# Patient Record
Sex: FEMALE | Race: WHITE | Employment: FULL TIME | URBAN - METROPOLITAN AREA
[De-identification: names, ages, dates, MRNs, and addresses within clinical notes are randomized per-mention and may not be internally consistent; named-entity substitution may affect disease eponyms.]

---

## 2018-08-08 ENCOUNTER — OFFICE VISIT (OUTPATIENT)
Dept: FAMILY MEDICINE CLINIC | Age: 48
End: 2018-08-08
Payer: COMMERCIAL

## 2018-08-08 ENCOUNTER — HOSPITAL ENCOUNTER (OUTPATIENT)
Age: 48
Setting detail: SPECIMEN
Discharge: HOME | End: 2018-08-08
Payer: COMMERCIAL

## 2018-08-08 VITALS
WEIGHT: 156.6 LBS | SYSTOLIC BLOOD PRESSURE: 114 MMHG | DIASTOLIC BLOOD PRESSURE: 83 MMHG | HEIGHT: 62 IN | HEART RATE: 101 BPM | TEMPERATURE: 98 F | BODY MASS INDEX: 28.82 KG/M2

## 2018-08-08 DIAGNOSIS — F41.1 GAD (GENERALIZED ANXIETY DISORDER): Primary | ICD-10-CM

## 2018-08-08 DIAGNOSIS — Z00.00 HEALTHCARE MAINTENANCE: ICD-10-CM

## 2018-08-08 DIAGNOSIS — R23.2 HOT FLASHES: ICD-10-CM

## 2018-08-08 LAB
CHOLESTEROL/HDL RATIO: 2.6
CHOLESTEROL: 259 MG/DL
ESTIMATED AVERAGE GLUCOSE: 100 MG/DL
HBA1C MFR BLD: 5.1 % (ref 4–6)
HDLC SERPL-MCNC: 98 MG/DL
LDL CHOLESTEROL: 142 MG/DL (ref 0–130)
TRIGL SERPL-MCNC: 94 MG/DL
VLDLC SERPL CALC-MCNC: ABNORMAL MG/DL (ref 1–30)

## 2018-08-08 PROCEDURE — 99203 OFFICE O/P NEW LOW 30 MIN: CPT | Performed by: HOSPITALIST

## 2018-08-08 RX ORDER — BUSPIRONE HYDROCHLORIDE 7.5 MG/1
7.5 TABLET ORAL 2 TIMES DAILY
Qty: 60 TABLET | Refills: 0 | Status: SHIPPED | OUTPATIENT
Start: 2018-08-08 | End: 2018-10-19 | Stop reason: SDUPTHER

## 2018-08-08 ASSESSMENT — ENCOUNTER SYMPTOMS
WHEEZING: 0
VOMITING: 0
SORE THROAT: 0
SHORTNESS OF BREATH: 0
ABDOMINAL PAIN: 0
NAUSEA: 0
RHINORRHEA: 0
DIARRHEA: 0
CONSTIPATION: 0
BACK PAIN: 0
COUGH: 0

## 2018-08-08 ASSESSMENT — PATIENT HEALTH QUESTIONNAIRE - PHQ9
SUM OF ALL RESPONSES TO PHQ9 QUESTIONS 1 & 2: 0
2. FEELING DOWN, DEPRESSED OR HOPELESS: 0
SUM OF ALL RESPONSES TO PHQ QUESTIONS 1-9: 0
1. LITTLE INTEREST OR PLEASURE IN DOING THINGS: 0
SUM OF ALL RESPONSES TO PHQ QUESTIONS 1-9: 0

## 2018-08-08 NOTE — PROGRESS NOTES
Attending Physician Statement  I have discussed the care of Ezeinincluding pertinent history and exam findings,  with the resident. I have reviewed the key elements of all parts of the encounter with the resident. I agree with the assessment, plan and orders as documented by the resident.   (GE Modifier)    Vasomotor symptoms- Post hysterectomy wants to see GYN  Pauline- Buspar 7.5 mg po bid/  referral
Visit Information    Have you changed or started any medications since your last visit including any over-the-counter medicines, vitamins, or herbal medicines? no   Have you stopped taking any of your medications? Is so, why? -  no  Are you having any side effects from any of your medications? - no    Have you seen any other physician or provider since your last visit? yes - pcp   Have you had any other diagnostic tests since your last visit?  no   Have you been seen in the emergency room and/or had an admission in a hospital since we last saw you?  no   Have you had your routine dental cleaning in the past 6 months?  no     Do you have an active [employer] account? If no, what is the barrier?   No: inactive    No care team member to display    Medical History Review  Past Medical, Family, and Social History reviewed and does not contribute to the patient presenting condition    Health Maintenance   Topic Date Due    HIV screen  03/15/1984    DTaP/Tdap/Td vaccine (1 - Tdap) 03/15/1988    Cervical cancer screen  03/15/1990    Lipid screen  03/15/2009    Diabetes screen  03/15/2009    Flu vaccine (1) 09/01/2018
speech difficulty, numbness and headaches. Psychiatric/Behavioral: Positive for sleep disturbance. Negative for confusion, decreased concentration, self-injury and suicidal ideas. The patient is nervous/anxious. Objective:    /83 (Site: Left Arm, Position: Sitting, Cuff Size: Medium Adult)   Pulse 101   Temp 98 °F (36.7 °C) (Oral)   Ht 5' 2\" (1.575 m)   Wt 156 lb 9.6 oz (71 kg)   BMI 28.64 kg/m²    BP Readings from Last 3 Encounters:   08/08/18 114/83     Physical Exam   Constitutional: She is oriented to person, place, and time. She appears well-developed and well-nourished. HENT:   Head: Normocephalic and atraumatic. Right Ear: Hearing and tympanic membrane normal. No drainage, swelling or tenderness. No middle ear effusion. Left Ear: Hearing and tympanic membrane normal. No drainage, swelling or tenderness. No middle ear effusion. Mouth/Throat: Uvula is midline, oropharynx is clear and moist and mucous membranes are normal. No oropharyngeal exudate. Neck: Normal range of motion. Neck supple. Cardiovascular: Normal rate, regular rhythm and normal heart sounds. Pulmonary/Chest: Effort normal and breath sounds normal. She has no wheezes. Abdominal: Soft. Bowel sounds are normal. There is no tenderness. Neurological: She is alert and oriented to person, place, and time. No results found for: WBC, HGB, HCT, PLT, CHOL, TRIG, HDL, LDLDIRECT, ALT, AST, NA, K, CL, CREATININE, BUN, CO2, TSH, PSA, INR, GLUF, LABA1C, LABMICR  No results found for: CALCIUM, PHOS  No results found for: LDLCALC, LDLCHOLESTEROL, LDLDIRECT    Assessment:     1. MINNA (generalized anxiety disorder)    2. Hot flashes    3. Healthcare maintenance        Plan:    1. MINNA (generalized anxiety disorder)  - will initiate psychotherapy/pharmacotherapy  - follow up with Dr. Annika Pascal, re started on buspar  - busPIRone (BUSPAR) 7.5 MG tablet; Take 1 tablet by mouth 2 times daily  Dispense: 60 tablet;  Refill:

## 2018-08-08 NOTE — PATIENT INSTRUCTIONS
Thank you for letting us take care of you today. We hope all your questions were addressed. If a question was overlooked or something else comes to mind after you return home, please contact a member of your Care Team listed below. Please make sure you have a routine office visit set up to follow-up on [address]. Your Care Team at [place] [address] is Team #2  Aleisha Aguilar DO (Faculty)  Tad Lam MD (Resident)  Jc Ward MD (Resident)  Cesar Smalls MD (Resident)  Boston Degroot MD (Resident)  Pedro Sewell MD (Resident)  Darling Miller, LPN  Aurora Koroma., [facility], [address] ([address] [facility])  Viry Milan RN, ([address] [facility])  Jason Gutierrez, Ph.D., (Behavioral Services)  Mariann Whiting, [address] [place] [address] (Clinical Pharmacist)     Office phone number: [phone]    If you need to get in right away due to illness, please be advised we have \"Same Day\" appointments available Monday-Friday. Please call us at [phone] option #3 to schedule your \"Same Day\" appointment. Patient Education          buspirone  Pronunciation:  kathy muir  Brand: BuSpar  What is the most important information I should know about buspirone? Do not use buspirone if you have taken an MAO inhibitor in the past 14 days. A dangerous drug interaction could occur. MAO inhibitors include isocarboxazid, linezolid, methylene blue injection, phenelzine, rasagiline, selegiline, and tranylcypromine. What is buspirone? Buspirone is an anti-anxiety medicine that affects chemicals in the brain that may be unbalanced in people with anxiety. Buspirone is used to treat symptoms of anxiety, such as fear, tension, irritability, dizziness, pounding heartbeat, and other physical symptoms. Buspirone is not an anti-psychotic medication and should not be used in place of medication prescribed by your doctor for mental illness.   Buspirone may also be used for purposes not listed in this medication instruction, always ask your healthcare professional. [place] [address] any warranty or liability for your use of this information. Patient Education        Anxiety Disorder: Care Instructions  Your Care Instructions    Anxiety is a normal reaction to stress. Difficult situations can cause you to have symptoms such as sweaty palms and a nervous feeling. In an anxiety disorder, the symptoms are far more severe. Constant worry, muscle tension, trouble sleeping, nausea and diarrhea, and other symptoms can make normal daily activities difficult or impossible. These symptoms may occur for no reason, and they can affect your work, school, or social life. Medicines, counseling, and self-care can all help. Follow-up care is a key part of your treatment and safety. Be sure to make and go to all appointments, and call your doctor if you are having problems. It's also a good idea to know your test results and keep a list of the medicines you take. How can you care for yourself at home? Take medicines exactly as directed. Call your doctor if you think you are having a problem with your medicine. Go to your counseling sessions and follow-up appointments. Recognize and accept your anxiety. Then, when you are in a situation that makes you anxious, say to yourself, \"This is not an emergency. I feel uncomfortable, but I am not in danger. I can keep going even if I feel anxious. \"  Be kind to your body:  Relieve tension with exercise or a massage. Get enough rest.  Avoid alcohol, caffeine, nicotine, and illegal drugs. They can increase your anxiety level and cause sleep problems. Learn and do relaxation techniques. See below for more about these techniques. Engage your mind. Get out and do something you enjoy. Go to a funny movie, or take a walk or hike. Plan your day. Having too much or too little to do can make you anxious. Keep a record of your symptoms.  Discuss your fears with a good friend or family

## 2018-09-10 ENCOUNTER — OFFICE VISIT (OUTPATIENT)
Dept: FAMILY MEDICINE CLINIC | Age: 48
End: 2018-09-10
Payer: COMMERCIAL

## 2018-09-10 ENCOUNTER — HOSPITAL ENCOUNTER (OUTPATIENT)
Age: 48
Setting detail: SPECIMEN
Discharge: HOME | End: 2018-09-10
Payer: COMMERCIAL

## 2018-09-10 VITALS
SYSTOLIC BLOOD PRESSURE: 123 MMHG | DIASTOLIC BLOOD PRESSURE: 79 MMHG | HEIGHT: 62 IN | BODY MASS INDEX: 29.26 KG/M2 | WEIGHT: 159 LBS | TEMPERATURE: 97.5 F | HEART RATE: 79 BPM

## 2018-09-10 DIAGNOSIS — Z23 NEED FOR TDAP VACCINATION: ICD-10-CM

## 2018-09-10 DIAGNOSIS — Z11.4 ENCOUNTER FOR SCREENING FOR HIV: ICD-10-CM

## 2018-09-10 DIAGNOSIS — F41.9 ANXIETY: ICD-10-CM

## 2018-09-10 DIAGNOSIS — R79.89 HIGH SERUM LOW-DENSITY LIPOPROTEIN (LDL): ICD-10-CM

## 2018-09-10 DIAGNOSIS — F41.9 ANXIETY: Primary | ICD-10-CM

## 2018-09-10 LAB
HIV AG/AB: NONREACTIVE
TSH SERPL DL<=0.05 MIU/L-ACNC: 0.73 MIU/L (ref 0.3–5)

## 2018-09-10 PROCEDURE — G0008 ADMIN INFLUENZA VIRUS VAC: HCPCS | Performed by: FAMILY MEDICINE

## 2018-09-10 PROCEDURE — 99213 OFFICE O/P EST LOW 20 MIN: CPT | Performed by: STUDENT IN AN ORGANIZED HEALTH CARE EDUCATION/TRAINING PROGRAM

## 2018-09-10 PROCEDURE — 90715 TDAP VACCINE 7 YRS/> IM: CPT

## 2018-09-10 RX ORDER — BUSPIRONE HYDROCHLORIDE 5 MG/1
7.5 TABLET ORAL 2 TIMES DAILY
Qty: 90 TABLET | Refills: 0 | Status: SHIPPED | OUTPATIENT
Start: 2018-09-10 | End: 2018-10-10

## 2018-09-10 RX ORDER — BUSPIRONE HYDROCHLORIDE 5 MG/1
7.5 TABLET ORAL 2 TIMES DAILY
COMMUNITY
End: 2018-09-10 | Stop reason: SDUPTHER

## 2018-09-10 ASSESSMENT — ENCOUNTER SYMPTOMS
COUGH: 0
WHEEZING: 0
SHORTNESS OF BREATH: 0

## 2018-09-10 NOTE — PATIENT INSTRUCTIONS
Instructions    Anxiety is a normal reaction to stress. Difficult situations can cause you to have symptoms such as sweaty palms and a nervous feeling. In an anxiety disorder, the symptoms are far more severe. Constant worry, muscle tension, trouble sleeping, nausea and diarrhea, and other symptoms can make normal daily activities difficult or impossible. These symptoms may occur for no reason, and they can affect your work, school, or social life. Medicines, counseling, and self-care can all help. Follow-up care is a key part of your treatment and safety. Be sure to make and go to all appointments, and call your doctor if you are having problems. It's also a good idea to know your test results and keep a list of the medicines you take. How can you care for yourself at home? · Take medicines exactly as directed. Call your doctor if you think you are having a problem with your medicine. · Go to your counseling sessions and follow-up appointments. · Recognize and accept your anxiety. Then, when you are in a situation that makes you anxious, say to yourself, \"This is not an emergency. I feel uncomfortable, but I am not in danger. I can keep going even if I feel anxious. \"  · Be kind to your body:  ¨ Relieve tension with exercise or a massage. ¨ Get enough rest.  ¨ Avoid alcohol, caffeine, nicotine, and illegal drugs. They can increase your anxiety level and cause sleep problems. ¨ Learn and do relaxation techniques. See below for more about these techniques. · Engage your mind. Get out and do something you enjoy. Go to a funny movie, or take a walk or hike. Plan your day. Having too much or too little to do can make you anxious. · Keep a record of your symptoms. Discuss your fears with a good friend or family member, or join a support group for people with similar problems. Talking to others sometimes relieves stress. · Get involved in social groups, or volunteer to help others.  Being alone sometimes makes help?  Call 911 anytime you think you may need emergency care. For example, call if:    · You feel you cannot stop from hurting yourself or someone else.   Denise Rivera the numbers for these national suicide hotlines: 6-548-018-TALK (8-767.647.1582) and 7-023-WILGKSY (8-540.511.4559). If you or someone you know talks about suicide or feeling hopeless, get help right away.   Watch closely for changes in your health, and be sure to contact your doctor if:    · You have anxiety or fear that affects your life.     · You have symptoms of anxiety that are new or different from those you had before. Where can you learn more? Go to https://[employer]. org and sign in to your [employer] account. Enter P754 in the "[employer] box to learn more about \"Anxiety Disorder: Care Instructions. \"     If you do not have an account, please click on the \"Sign Up Now\" link. Current as of: December 7, 2017  Content Version: 11.7  © 3641-8941 [employer]. Care instructions adapted under license by [facility] ([facility]). If you have questions about a medical condition or this instruction, always ask your healthcare professional. [address] any warranty or liability for your use of this information. Patient Education        Cholesterol and Triglycerides Tests: About These Tests  What are they? Cholesterol and triglycerides tests measure the amount of fats in your blood. These fats have both \"good\" (HDL) and \"bad\" (LDL) cholesterol. Why are these tests done? These tests are done to help find out your risk of a heart attack and stroke. They can help your doctor find out how well medicine is working for some health problems. How can you prepare for these tests? · Your doctor may tell you to fast before your tests. This means that you do not eat or drink anything except water for 9 to 12 hours before the tests.  In most cases, you can take your medicines with water the morning of the test.  · Do not eat high-fat foods the night before the tests. · Do not drink alcohol or do intense exercise the night before the tests. · Be sure to tell your doctor about all the over-the-counter and prescription medicines and herbs or other supplements you take. They can affect the results of these tests. What happens during these tests? A health professional takes a sample of your blood. What else should you know about these tests? Your cholesterol levels can help your doctor find out your risk for having a heart attack or stroke. But it's not just about your cholesterol. Your doctor uses your cholesterol levels plus other things to calculate your risk. These include:  · Your blood pressure. · Whether or not you have diabetes. · Your age, sex, and race. · Whether or not you smoke. You and your doctor can talk about whether you need to lower your risk and what treatment is best for you. Where can you learn more? Go to https://[employer]ango.[employer]. org and sign in to your [employer] account. Enter V861 in the [employer] box to learn more about \"Cholesterol and Triglycerides Tests: About These Tests. \"     If you do not have an account, please click on the \"Sign Up Now\" link. Current as of: May 10, 2017  Content Version: 11.7  © 1400-6080 [employer]. Care instructions adapted under license by [facility] ([facility]). If you have questions about a medical condition or this instruction, always ask your healthcare professional. [address] any warranty or liability for your use of this information.

## 2018-09-10 NOTE — PROGRESS NOTES
Attending Physician Statement  I have discussed the care of Vannormaustinincluding pertinent history and exam findings,  with the resident. I have reviewed the key elements of all parts of the encounter with the resident. I agree with the assessment, plan and orders as documented by the resident.   (GE Modifier)    MINNA- Stable on Buspar/TSH  HM updated

## 2018-09-10 NOTE — PROGRESS NOTES
Visit Information    Have you changed or started any medications since your last visit including any over-the-counter medicines, vitamins, or herbal medicines? no   Have you stopped taking any of your medications? Is so, why? -  no  Are you having any side effects from any of your medications? - no    Have you seen any other physician or provider since your last visit?  no   Have you had any other diagnostic tests since your last visit?  no   Have you been seen in the emergency room and/or had an admission in a hospital since we last saw you?  no   Have you had your routine dental cleaning in the past 6 months?  no     Do you have an active MyChart account? If no, what is the barrier?   No:     Patient Care Team:  Linda Garcia MD as PCP - General (Family Medicine)    Medical History Review  Past Medical, Family, and Social History reviewed and does not contribute to the patient presenting condition    Health Maintenance   Topic Date Due    HIV screen  03/15/1984    DTaP/Tdap/Td vaccine (1 - Tdap) 03/15/1988    Cervical cancer screen  03/15/1990    Flu vaccine (1) 09/01/2018    Diabetes screen  08/08/2021    Lipid screen  08/08/2023

## 2018-10-19 ENCOUNTER — OFFICE VISIT (OUTPATIENT)
Dept: FAMILY MEDICINE CLINIC | Age: 48
End: 2018-10-19
Payer: COMMERCIAL

## 2018-10-19 VITALS
BODY MASS INDEX: 28.89 KG/M2 | HEART RATE: 84 BPM | SYSTOLIC BLOOD PRESSURE: 118 MMHG | HEIGHT: 62 IN | DIASTOLIC BLOOD PRESSURE: 83 MMHG | WEIGHT: 157 LBS

## 2018-10-19 DIAGNOSIS — F41.1 GENERALIZED ANXIETY DISORDER: ICD-10-CM

## 2018-10-19 DIAGNOSIS — F41.1 GAD (GENERALIZED ANXIETY DISORDER): ICD-10-CM

## 2018-10-19 DIAGNOSIS — S46.911D STRAIN OF RIGHT SHOULDER, SUBSEQUENT ENCOUNTER: Primary | ICD-10-CM

## 2018-10-19 PROBLEM — J01.01 ACUTE RECURRENT MAXILLARY SINUSITIS: Status: ACTIVE | Noted: 2018-01-23

## 2018-10-19 PROBLEM — N95.1 MENOPAUSE SYNDROME: Status: ACTIVE | Noted: 2017-08-18

## 2018-10-19 PROBLEM — N95.1 HOT FLASHES DUE TO MENOPAUSE: Status: ACTIVE | Noted: 2017-08-15

## 2018-10-19 PROBLEM — Z12.31 ENCOUNTER FOR SCREENING MAMMOGRAM FOR MALIGNANT NEOPLASM OF BREAST: Status: ACTIVE | Noted: 2018-07-11

## 2018-10-19 PROBLEM — E66.3 OVERWEIGHT (BMI 25.0-29.9): Status: ACTIVE | Noted: 2017-08-15

## 2018-10-19 PROCEDURE — 99213 OFFICE O/P EST LOW 20 MIN: CPT | Performed by: HOSPITALIST

## 2018-10-19 RX ORDER — BUSPIRONE HYDROCHLORIDE 15 MG/1
15 TABLET ORAL 2 TIMES DAILY
Qty: 60 TABLET | Refills: 3 | Status: SHIPPED | OUTPATIENT
Start: 2018-10-19 | End: 2018-11-18

## 2018-10-19 ASSESSMENT — ENCOUNTER SYMPTOMS
WHEEZING: 0
SHORTNESS OF BREATH: 0
BACK PAIN: 0
COUGH: 0

## 2018-10-19 NOTE — PROGRESS NOTES
HYPERTENSION visit     BP Readings from Last 3 Encounters:   10/19/18 118/83   09/10/18 123/79   08/08/18 114/83       LDL Cholesterol (mg/dL)   Date Value   08/08/2018 142 (H)     HDL (mg/dL)   Date Value   08/08/2018 98              Have you changed or started any medications since your last visit including any over-the-counter medicines, vitamins, or herbal medicines? no   Have you stopped taking any of your medications? Is so, why? -  no  Are you having any side effects from any of your medications? - no  How often do you miss doses of your medication? no      Have you seen any other physician or provider since your last visit? yes - urgent care   Have you had any other diagnostic tests since your last visit? yes -    Have you been seen in the emergency room and/or had an admission in a hospital since we last saw you?  yes -    Have you had your routine dental cleaning in the past 6 months?  no     Do you have an active MyChart account? If no, what is the barrier?   No:     Patient Care Team:  Poncho Massey MD as PCP - General (Family Medicine)    Medical History Review  Past Medical, Family, and Social History reviewed and does not contribute to the patient presenting condition    Health Maintenance   Topic Date Due    Cervical cancer screen  03/15/1990    Flu vaccine (1) 09/01/2018    Diabetes screen  08/08/2021    Lipid screen  08/08/2023    DTaP/Tdap/Td vaccine (2 - Td) 09/10/2028    HIV screen  Completed
Component Value Date    LDLCHOLESTEROL 142 (H) 08/08/2018    (goal LDL reduction with dx if diabetes is 50% LDL reduction)      PHQ Scores 8/8/2018   PHQ2 Score 0   PHQ9 Score 0     Interpretation of Total Score Depression Severity: 1-4 = Minimal depression, 5-9 = Mild depression, 10-14 = Moderate depression, 15-19 = Moderately severe depression, 20-27 = Severe depression  Discussed use, benefit, and side effects of prescribed medications. Barriers to medicationcompliance addressed. All patient questions answered. Pt voiced understanding. Medications Discontinued During This Encounter   Medication Reason    busPIRone (BUSPAR) 7.5 MG tablet REORDER       Return in about 2 months (around 12/19/2018) for anxiety.

## 2018-11-18 PROBLEM — Z12.31 ENCOUNTER FOR SCREENING MAMMOGRAM FOR MALIGNANT NEOPLASM OF BREAST: Status: RESOLVED | Noted: 2018-07-11 | Resolved: 2018-11-18

## 2019-03-07 ENCOUNTER — OFFICE VISIT (OUTPATIENT)
Dept: FAMILY MEDICINE CLINIC | Age: 49
End: 2019-03-07
Payer: COMMERCIAL

## 2019-03-07 VITALS
SYSTOLIC BLOOD PRESSURE: 108 MMHG | WEIGHT: 159.4 LBS | HEART RATE: 72 BPM | HEIGHT: 62 IN | TEMPERATURE: 97 F | BODY MASS INDEX: 29.33 KG/M2 | DIASTOLIC BLOOD PRESSURE: 68 MMHG

## 2019-03-07 DIAGNOSIS — J01.00 ACUTE NON-RECURRENT MAXILLARY SINUSITIS: Primary | ICD-10-CM

## 2019-03-07 DIAGNOSIS — F41.9 ANXIETY: ICD-10-CM

## 2019-03-07 PROCEDURE — G8427 DOCREV CUR MEDS BY ELIG CLIN: HCPCS | Performed by: HOSPITALIST

## 2019-03-07 PROCEDURE — G8484 FLU IMMUNIZE NO ADMIN: HCPCS | Performed by: HOSPITALIST

## 2019-03-07 PROCEDURE — 1036F TOBACCO NON-USER: CPT | Performed by: HOSPITALIST

## 2019-03-07 PROCEDURE — 99213 OFFICE O/P EST LOW 20 MIN: CPT | Performed by: HOSPITALIST

## 2019-03-07 PROCEDURE — G8419 CALC BMI OUT NRM PARAM NOF/U: HCPCS | Performed by: HOSPITALIST

## 2019-03-07 RX ORDER — BUSPIRONE HYDROCHLORIDE 30 MG/1
30 TABLET ORAL DAILY
Qty: 30 TABLET | Refills: 0 | Status: SHIPPED | OUTPATIENT
Start: 2019-03-07 | End: 2020-01-15

## 2019-03-07 RX ORDER — CETIRIZINE HYDROCHLORIDE 10 MG/1
10 TABLET ORAL DAILY
Qty: 30 TABLET | Refills: 0 | Status: SHIPPED | OUTPATIENT
Start: 2019-03-07 | End: 2019-04-06

## 2019-03-07 RX ORDER — BUSPIRONE HYDROCHLORIDE 30 MG/1
TABLET ORAL
Refills: 0 | COMMUNITY
Start: 2019-02-04 | End: 2019-03-07 | Stop reason: SDUPTHER

## 2019-03-07 RX ORDER — FLUTICASONE PROPIONATE 50 MCG
1 SPRAY, SUSPENSION (ML) NASAL DAILY
Qty: 2 BOTTLE | Refills: 1 | Status: SHIPPED | OUTPATIENT
Start: 2019-03-07

## 2019-03-07 ASSESSMENT — ENCOUNTER SYMPTOMS
SORE THROAT: 1
BACK PAIN: 0
SINUS PRESSURE: 1
HOARSE VOICE: 0
ABDOMINAL PAIN: 0
NAUSEA: 0
CONSTIPATION: 0
VOMITING: 0
DIARRHEA: 0
SINUS COMPLAINT: 1
SHORTNESS OF BREATH: 0
SWOLLEN GLANDS: 0
WHEEZING: 0
COUGH: 1

## 2019-03-07 ASSESSMENT — PATIENT HEALTH QUESTIONNAIRE - PHQ9
SUM OF ALL RESPONSES TO PHQ QUESTIONS 1-9: 0
SUM OF ALL RESPONSES TO PHQ9 QUESTIONS 1 & 2: 0
SUM OF ALL RESPONSES TO PHQ QUESTIONS 1-9: 0
1. LITTLE INTEREST OR PLEASURE IN DOING THINGS: 0
2. FEELING DOWN, DEPRESSED OR HOPELESS: 0

## 2019-09-26 ENCOUNTER — OFFICE VISIT (OUTPATIENT)
Dept: FAMILY MEDICINE CLINIC | Age: 49
End: 2019-09-26
Payer: COMMERCIAL

## 2019-09-26 VITALS
BODY MASS INDEX: 29.81 KG/M2 | SYSTOLIC BLOOD PRESSURE: 124 MMHG | WEIGHT: 162 LBS | HEART RATE: 58 BPM | DIASTOLIC BLOOD PRESSURE: 86 MMHG | HEIGHT: 62 IN | TEMPERATURE: 97.7 F

## 2019-09-26 DIAGNOSIS — Z12.31 ENCOUNTER FOR SCREENING MAMMOGRAM FOR BREAST CANCER: ICD-10-CM

## 2019-09-26 DIAGNOSIS — Z23 NEED FOR PROPHYLACTIC VACCINATION AND INOCULATION AGAINST VARICELLA: ICD-10-CM

## 2019-09-26 DIAGNOSIS — K59.00 CONSTIPATION, UNSPECIFIED CONSTIPATION TYPE: Primary | ICD-10-CM

## 2019-09-26 PROCEDURE — 3017F COLORECTAL CA SCREEN DOC REV: CPT | Performed by: STUDENT IN AN ORGANIZED HEALTH CARE EDUCATION/TRAINING PROGRAM

## 2019-09-26 PROCEDURE — 1036F TOBACCO NON-USER: CPT | Performed by: STUDENT IN AN ORGANIZED HEALTH CARE EDUCATION/TRAINING PROGRAM

## 2019-09-26 PROCEDURE — 99213 OFFICE O/P EST LOW 20 MIN: CPT | Performed by: STUDENT IN AN ORGANIZED HEALTH CARE EDUCATION/TRAINING PROGRAM

## 2019-09-26 PROCEDURE — G8427 DOCREV CUR MEDS BY ELIG CLIN: HCPCS | Performed by: STUDENT IN AN ORGANIZED HEALTH CARE EDUCATION/TRAINING PROGRAM

## 2019-09-26 PROCEDURE — G8419 CALC BMI OUT NRM PARAM NOF/U: HCPCS | Performed by: STUDENT IN AN ORGANIZED HEALTH CARE EDUCATION/TRAINING PROGRAM

## 2019-09-26 RX ORDER — DOCUSATE SODIUM 100 MG/1
100 CAPSULE, LIQUID FILLED ORAL 2 TIMES DAILY
Qty: 60 CAPSULE | Refills: 0 | Status: SHIPPED | OUTPATIENT
Start: 2019-09-26 | End: 2019-10-26

## 2019-09-26 ASSESSMENT — ENCOUNTER SYMPTOMS
ABDOMINAL PAIN: 1
DIARRHEA: 0
ABDOMINAL DISTENTION: 1
BLOOD IN STOOL: 0
CONSTIPATION: 1
ANAL BLEEDING: 0
VOMITING: 0
CHEST TIGHTNESS: 0
NAUSEA: 0
COUGH: 0
SHORTNESS OF BREATH: 0
WHEEZING: 0

## 2019-09-30 ENCOUNTER — HOSPITAL ENCOUNTER (OUTPATIENT)
Dept: MAMMOGRAPHY | Age: 49
Discharge: HOME | End: 2019-10-02
Payer: COMMERCIAL

## 2019-09-30 DIAGNOSIS — Z12.31 ENCOUNTER FOR SCREENING MAMMOGRAM FOR BREAST CANCER: ICD-10-CM

## 2019-09-30 PROCEDURE — 77063 BREAST TOMOSYNTHESIS BI: CPT

## 2019-10-16 ENCOUNTER — HOSPITAL ENCOUNTER (OUTPATIENT)
Age: 49
Setting detail: SPECIMEN
Discharge: HOME | End: 2019-10-16
Payer: COMMERCIAL

## 2019-10-16 ENCOUNTER — OFFICE VISIT (OUTPATIENT)
Dept: FAMILY MEDICINE CLINIC | Age: 49
End: 2019-10-16
Payer: COMMERCIAL

## 2019-10-16 ENCOUNTER — TELEPHONE (OUTPATIENT)
Dept: GASTROENTEROLOGY | Age: 49
End: 2019-10-16

## 2019-10-16 VITALS
SYSTOLIC BLOOD PRESSURE: 125 MMHG | WEIGHT: 152 LBS | HEART RATE: 86 BPM | BODY MASS INDEX: 27.97 KG/M2 | HEIGHT: 62 IN | DIASTOLIC BLOOD PRESSURE: 80 MMHG

## 2019-10-16 DIAGNOSIS — Z12.11 COLON CANCER SCREENING: ICD-10-CM

## 2019-10-16 DIAGNOSIS — K59.00 CONSTIPATION, UNSPECIFIED CONSTIPATION TYPE: Primary | ICD-10-CM

## 2019-10-16 DIAGNOSIS — G89.29 CHRONIC MIDLINE LOW BACK PAIN WITHOUT SCIATICA: ICD-10-CM

## 2019-10-16 DIAGNOSIS — M54.50 CHRONIC MIDLINE LOW BACK PAIN WITHOUT SCIATICA: ICD-10-CM

## 2019-10-16 LAB
ANION GAP SERPL CALCULATED.3IONS-SCNC: 13 MMOL/L (ref 9–17)
BUN BLDV-MCNC: 12 MG/DL (ref 6–20)
BUN/CREAT BLD: ABNORMAL (ref 9–20)
CALCIUM SERPL-MCNC: 9.3 MG/DL (ref 8.6–10.4)
CHLORIDE BLD-SCNC: 107 MMOL/L (ref 98–107)
CO2: 22 MMOL/L (ref 20–31)
CREAT SERPL-MCNC: 0.85 MG/DL (ref 0.5–0.9)
GFR AFRICAN AMERICAN: >60 ML/MIN
GFR NON-AFRICAN AMERICAN: >60 ML/MIN
GFR SERPL CREATININE-BSD FRML MDRD: ABNORMAL ML/MIN/{1.73_M2}
GFR SERPL CREATININE-BSD FRML MDRD: ABNORMAL ML/MIN/{1.73_M2}
GLUCOSE BLD-MCNC: 101 MG/DL (ref 70–99)
POTASSIUM SERPL-SCNC: 3.8 MMOL/L (ref 3.7–5.3)
SODIUM BLD-SCNC: 142 MMOL/L (ref 135–144)

## 2019-10-16 PROCEDURE — 3017F COLORECTAL CA SCREEN DOC REV: CPT | Performed by: STUDENT IN AN ORGANIZED HEALTH CARE EDUCATION/TRAINING PROGRAM

## 2019-10-16 PROCEDURE — 1036F TOBACCO NON-USER: CPT | Performed by: STUDENT IN AN ORGANIZED HEALTH CARE EDUCATION/TRAINING PROGRAM

## 2019-10-16 PROCEDURE — 99213 OFFICE O/P EST LOW 20 MIN: CPT | Performed by: STUDENT IN AN ORGANIZED HEALTH CARE EDUCATION/TRAINING PROGRAM

## 2019-10-16 PROCEDURE — G8484 FLU IMMUNIZE NO ADMIN: HCPCS | Performed by: STUDENT IN AN ORGANIZED HEALTH CARE EDUCATION/TRAINING PROGRAM

## 2019-10-16 PROCEDURE — 99211 OFF/OP EST MAY X REQ PHY/QHP: CPT | Performed by: FAMILY MEDICINE

## 2019-10-16 PROCEDURE — G8427 DOCREV CUR MEDS BY ELIG CLIN: HCPCS | Performed by: STUDENT IN AN ORGANIZED HEALTH CARE EDUCATION/TRAINING PROGRAM

## 2019-10-16 PROCEDURE — G8419 CALC BMI OUT NRM PARAM NOF/U: HCPCS | Performed by: STUDENT IN AN ORGANIZED HEALTH CARE EDUCATION/TRAINING PROGRAM

## 2019-10-16 RX ORDER — NAPROXEN 375 MG/1
375 TABLET ORAL 2 TIMES DAILY PRN
Qty: 60 TABLET | Refills: 0 | Status: SHIPPED | OUTPATIENT
Start: 2019-10-16 | End: 2020-01-15

## 2019-10-16 ASSESSMENT — ENCOUNTER SYMPTOMS
VOMITING: 0
ABDOMINAL DISTENTION: 1
BLOOD IN STOOL: 0
CHEST TIGHTNESS: 0
NAUSEA: 0
ANAL BLEEDING: 0
ABDOMINAL PAIN: 1
WHEEZING: 0
DIARRHEA: 0
CONSTIPATION: 1

## 2019-11-05 ENCOUNTER — TELEPHONE (OUTPATIENT)
Dept: GASTROENTEROLOGY | Age: 49
End: 2019-11-05

## 2019-11-06 ENCOUNTER — HOSPITAL ENCOUNTER (OUTPATIENT)
Age: 49
Setting detail: SPECIMEN
Discharge: HOME | End: 2019-11-06
Payer: COMMERCIAL

## 2019-11-06 ENCOUNTER — OFFICE VISIT (OUTPATIENT)
Dept: GASTROENTEROLOGY | Age: 49
End: 2019-11-06
Payer: COMMERCIAL

## 2019-11-06 VITALS
SYSTOLIC BLOOD PRESSURE: 119 MMHG | BODY MASS INDEX: 27.98 KG/M2 | WEIGHT: 153 LBS | DIASTOLIC BLOOD PRESSURE: 80 MMHG | HEART RATE: 56 BPM

## 2019-11-06 DIAGNOSIS — K59.00 CONSTIPATION, UNSPECIFIED CONSTIPATION TYPE: Primary | ICD-10-CM

## 2019-11-06 DIAGNOSIS — K59.00 CONSTIPATION, UNSPECIFIED CONSTIPATION TYPE: ICD-10-CM

## 2019-11-06 LAB
ABSOLUTE EOS #: 0.03 K/UL (ref 0–0.44)
ABSOLUTE IMMATURE GRANULOCYTE: <0.03 K/UL (ref 0–0.3)
ABSOLUTE LYMPH #: 3.09 K/UL (ref 1.1–3.7)
ABSOLUTE MONO #: 0.4 K/UL (ref 0.1–1.2)
ALBUMIN SERPL-MCNC: 4.4 G/DL (ref 3.5–5.2)
ALBUMIN/GLOBULIN RATIO: 1.7 (ref 1–2.5)
ALP BLD-CCNC: 64 U/L (ref 35–104)
ALT SERPL-CCNC: 21 U/L (ref 5–33)
AST SERPL-CCNC: 25 U/L
BASOPHILS # BLD: 1 % (ref 0–2)
BASOPHILS ABSOLUTE: 0.03 K/UL (ref 0–0.2)
BILIRUB SERPL-MCNC: 0.33 MG/DL (ref 0.3–1.2)
BILIRUBIN DIRECT: 0.11 MG/DL
BILIRUBIN, INDIRECT: 0.22 MG/DL (ref 0–1)
DIFFERENTIAL TYPE: ABNORMAL
EOSINOPHILS RELATIVE PERCENT: 1 % (ref 1–4)
GLOBULIN: NORMAL G/DL (ref 1.5–3.8)
HCT VFR BLD CALC: 41.7 % (ref 36.3–47.1)
HEMOGLOBIN: 13.5 G/DL (ref 11.9–15.1)
IMMATURE GRANULOCYTES: 0 %
LYMPHOCYTES # BLD: 62 % (ref 24–43)
MCH RBC QN AUTO: 28.5 PG (ref 25.2–33.5)
MCHC RBC AUTO-ENTMCNC: 32.4 G/DL (ref 28.4–34.8)
MCV RBC AUTO: 88.2 FL (ref 82.6–102.9)
MONOCYTES # BLD: 8 % (ref 3–12)
NRBC AUTOMATED: 0 PER 100 WBC
PDW BLD-RTO: 12.9 % (ref 11.8–14.4)
PLATELET # BLD: 227 K/UL (ref 138–453)
PLATELET ESTIMATE: ABNORMAL
PMV BLD AUTO: 11.8 FL (ref 8.1–13.5)
RBC # BLD: 4.73 M/UL (ref 3.95–5.11)
RBC # BLD: ABNORMAL 10*6/UL
SEG NEUTROPHILS: 29 % (ref 36–65)
SEGMENTED NEUTROPHILS ABSOLUTE COUNT: 1.43 K/UL (ref 1.5–8.1)
TOTAL PROTEIN: 7 G/DL (ref 6.4–8.3)
WBC # BLD: 5 K/UL (ref 3.5–11.3)
WBC # BLD: ABNORMAL 10*3/UL

## 2019-11-06 PROCEDURE — G8484 FLU IMMUNIZE NO ADMIN: HCPCS | Performed by: INTERNAL MEDICINE

## 2019-11-06 PROCEDURE — G8427 DOCREV CUR MEDS BY ELIG CLIN: HCPCS | Performed by: INTERNAL MEDICINE

## 2019-11-06 PROCEDURE — 1036F TOBACCO NON-USER: CPT | Performed by: INTERNAL MEDICINE

## 2019-11-06 PROCEDURE — G8419 CALC BMI OUT NRM PARAM NOF/U: HCPCS | Performed by: INTERNAL MEDICINE

## 2019-11-06 PROCEDURE — 99204 OFFICE O/P NEW MOD 45 MIN: CPT | Performed by: INTERNAL MEDICINE

## 2019-11-06 PROCEDURE — 3017F COLORECTAL CA SCREEN DOC REV: CPT | Performed by: INTERNAL MEDICINE

## 2019-11-06 RX ORDER — AMOXICILLIN 250 MG
1 CAPSULE ORAL DAILY PRN
Qty: 30 TABLET | Refills: 2 | Status: SHIPPED | OUTPATIENT
Start: 2019-11-06 | End: 2021-02-10

## 2019-11-06 RX ORDER — OMEPRAZOLE 20 MG/1
20 CAPSULE, DELAYED RELEASE ORAL DAILY
Qty: 30 CAPSULE | Refills: 3 | Status: SHIPPED | OUTPATIENT
Start: 2019-11-06 | End: 2020-01-15 | Stop reason: ALTCHOICE

## 2019-11-06 RX ORDER — POLYETHYLENE GLYCOL 3350 17 G/17G
17 POWDER, FOR SOLUTION ORAL DAILY
Qty: 30 BOTTLE | Refills: 11 | COMMUNITY
Start: 2019-11-06 | End: 2019-12-06

## 2019-11-06 ASSESSMENT — ENCOUNTER SYMPTOMS
VOMITING: 1
NAUSEA: 1
EYES NEGATIVE: 1
ABDOMINAL DISTENTION: 1
RESPIRATORY NEGATIVE: 1
CONSTIPATION: 1
ABDOMINAL PAIN: 1
BACK PAIN: 1
DIARRHEA: 1
ALLERGIC/IMMUNOLOGIC NEGATIVE: 1

## 2019-12-04 ENCOUNTER — TELEPHONE (OUTPATIENT)
Dept: GASTROENTEROLOGY | Age: 49
End: 2019-12-04

## 2020-01-14 ENCOUNTER — TELEPHONE (OUTPATIENT)
Dept: GASTROENTEROLOGY | Age: 50
End: 2020-01-14

## 2020-01-15 ENCOUNTER — OFFICE VISIT (OUTPATIENT)
Dept: GASTROENTEROLOGY | Age: 50
End: 2020-01-15
Payer: COMMERCIAL

## 2020-01-15 VITALS
SYSTOLIC BLOOD PRESSURE: 124 MMHG | DIASTOLIC BLOOD PRESSURE: 79 MMHG | HEART RATE: 64 BPM | BODY MASS INDEX: 26.7 KG/M2 | WEIGHT: 146 LBS

## 2020-01-15 PROCEDURE — 1036F TOBACCO NON-USER: CPT | Performed by: INTERNAL MEDICINE

## 2020-01-15 PROCEDURE — 3017F COLORECTAL CA SCREEN DOC REV: CPT | Performed by: INTERNAL MEDICINE

## 2020-01-15 PROCEDURE — G8419 CALC BMI OUT NRM PARAM NOF/U: HCPCS | Performed by: INTERNAL MEDICINE

## 2020-01-15 PROCEDURE — 99214 OFFICE O/P EST MOD 30 MIN: CPT | Performed by: INTERNAL MEDICINE

## 2020-01-15 PROCEDURE — G8484 FLU IMMUNIZE NO ADMIN: HCPCS | Performed by: INTERNAL MEDICINE

## 2020-01-15 PROCEDURE — G8427 DOCREV CUR MEDS BY ELIG CLIN: HCPCS | Performed by: INTERNAL MEDICINE

## 2020-01-15 RX ORDER — SODIUM, POTASSIUM,MAG SULFATES 17.5-3.13G
1 SOLUTION, RECONSTITUTED, ORAL ORAL ONCE
Qty: 1 BOTTLE | Refills: 0 | Status: SHIPPED | OUTPATIENT
Start: 2020-01-15 | End: 2020-01-15

## 2020-01-15 RX ORDER — LACTULOSE 10 G/15ML
10 SOLUTION ORAL 2 TIMES DAILY
Qty: 1 BOTTLE | Refills: 1 | Status: SHIPPED | OUTPATIENT
Start: 2020-01-15 | End: 2021-02-10

## 2020-01-15 RX ORDER — OMEPRAZOLE 40 MG/1
40 CAPSULE, DELAYED RELEASE ORAL DAILY
Qty: 30 CAPSULE | Refills: 3 | Status: SHIPPED | OUTPATIENT
Start: 2020-01-15 | End: 2021-02-10

## 2020-01-15 ASSESSMENT — ENCOUNTER SYMPTOMS
ABDOMINAL DISTENTION: 1
CONSTIPATION: 1
VOMITING: 1
ANAL BLEEDING: 1
NAUSEA: 1
RECTAL PAIN: 1

## 2020-01-15 NOTE — PROGRESS NOTES
pains.  ROS was otherwise negative    Review of Systems    PHYSICAL EXAMINATION: Vital signs reviewed per the nursing documentation. /79   Pulse 64   Wt 146 lb (66.2 kg)   BMI 26.70 kg/m²   Body mass index is 26.7 kg/m². Physical Exam    Physical Exam   Constitutional: Patient is oriented to person, place, and time. Patient appears well-developed and well-nourished. HENT:   Head: Normocephalic and atraumatic. Eyes: Pupils are equal, round, and reactive to light. EOM are normal.   Neck: Normal range of motion. Neck supple. No JVD present. No tracheal deviation present. No thyromegaly present. Cardiovascular: Normal rate, regular rhythm, normal heart sounds and intact distal pulses. Pulmonary/Chest: Effort normal and breath sounds normal. No stridor. No respiratory distress. He has no wheezes. He has no rales. He exhibits no tenderness. Abdominal: Soft. Bowel sounds are normal. He exhibits no distension and no mass. There is no tenderness. There is no rebound and no guarding. No hernia. Musculoskeletal: Normal range of motion. Lymphadenopathy:    Patient has no cervical adenopathy. Neurological: Patient is alert and oriented to person, place, and time. Psychiatric: Patient has a normal mood and affect.  Patient behavior is normal.       LABORATORY DATA: Reviewed  Lab Results   Component Value Date    WBC 5.0 11/06/2019    HGB 13.5 11/06/2019    HCT 41.7 11/06/2019    MCV 88.2 11/06/2019     11/06/2019     10/16/2019    K 3.8 10/16/2019     10/16/2019    CO2 22 10/16/2019    BUN 12 10/16/2019    CREATININE 0.85 10/16/2019    LABALBU 4.4 11/06/2019    BILITOT 0.33 11/06/2019    ALKPHOS 64 11/06/2019    AST 25 11/06/2019    ALT 21 11/06/2019         Lab Results   Component Value Date    RBC 4.73 11/06/2019    HGB 13.5 11/06/2019    MCV 88.2 11/06/2019    MCH 28.5 11/06/2019    MCHC 32.4 11/06/2019    RDW 12.9 11/06/2019    MPV 11.8 11/06/2019    BASOPCT 1 11/06/2019 LYMPHSABS 3.09 11/06/2019    MONOSABS 0.40 11/06/2019    NEUTROABS 1.43 (L) 11/06/2019    EOSABS 0.03 11/06/2019    BASOSABS 0.03 11/06/2019         DIAGNOSTIC TESTING:     No results found. IMPRESSION:  Evaristo Armas is a 47 y.o. female with history of MINNA, chronic constipation, dependent on PO laxatives, referred for evaluation and treatment of her constipation.      Chronic constipation--approximately 7 yrs, 1 bowel movement per week, Exlax currently being used, dependent x 2 yrs, on fiber supplementation, previously on stool softeners    Prilosec changed to 40mg daily, currently taking 20mg prn.  2-3 bottles of water is routinely ingested, appears to be adequate  Will add lactulose   Pericolace BID  Plan for EGD and Colonoscopy, dx testing. RBA explained. Thank you for allowing me to participate in the care of Ms. Lane. For any further questions please do not hesitate to contact me. I have reviewed and agree with the ROS entered by the MA/LPN.          Francie Kramer MD, MPH   [facility]  Office #: [phone]

## 2020-02-04 ENCOUNTER — TELEPHONE (OUTPATIENT)
Dept: GASTROENTEROLOGY | Age: 50
End: 2020-02-04

## 2020-02-04 NOTE — TELEPHONE ENCOUNTER
Pino Seymour returned call to confirm colon/egd on 02/11/20. She has a  and confirmed timing of bowel prep.

## 2020-02-11 ENCOUNTER — ANESTHESIA (OUTPATIENT)
Dept: OPERATING ROOM | Age: 50
End: 2020-02-11
Payer: COMMERCIAL

## 2020-02-11 ENCOUNTER — ANESTHESIA EVENT (OUTPATIENT)
Dept: OPERATING ROOM | Age: 50
End: 2020-02-11
Payer: COMMERCIAL

## 2020-02-11 ENCOUNTER — HOSPITAL ENCOUNTER (OUTPATIENT)
Age: 50
Setting detail: OUTPATIENT SURGERY
Discharge: HOME | End: 2020-02-11
Attending: INTERNAL MEDICINE | Admitting: INTERNAL MEDICINE
Payer: COMMERCIAL

## 2020-02-11 VITALS
BODY MASS INDEX: 26.28 KG/M2 | RESPIRATION RATE: 10 BRPM | HEIGHT: 62 IN | DIASTOLIC BLOOD PRESSURE: 68 MMHG | SYSTOLIC BLOOD PRESSURE: 112 MMHG | WEIGHT: 142.8 LBS | HEART RATE: 64 BPM | OXYGEN SATURATION: 100 % | TEMPERATURE: 97.2 F

## 2020-02-11 VITALS — DIASTOLIC BLOOD PRESSURE: 63 MMHG | SYSTOLIC BLOOD PRESSURE: 94 MMHG | OXYGEN SATURATION: 98 %

## 2020-02-11 PROCEDURE — 43239 EGD BIOPSY SINGLE/MULTIPLE: CPT | Performed by: INTERNAL MEDICINE

## 2020-02-11 PROCEDURE — 3700000001 HC ADD 15 MINUTES (ANESTHESIA): Performed by: INTERNAL MEDICINE

## 2020-02-11 PROCEDURE — 45378 DIAGNOSTIC COLONOSCOPY: CPT | Performed by: INTERNAL MEDICINE

## 2020-02-11 PROCEDURE — 3609027000 HC COLONOSCOPY: Performed by: INTERNAL MEDICINE

## 2020-02-11 PROCEDURE — 3700000000 HC ANESTHESIA ATTENDED CARE: Performed by: INTERNAL MEDICINE

## 2020-02-11 PROCEDURE — 7100000011 HC PHASE II RECOVERY - ADDTL 15 MIN: Performed by: INTERNAL MEDICINE

## 2020-02-11 PROCEDURE — 88305 TISSUE EXAM BY PATHOLOGIST: CPT

## 2020-02-11 PROCEDURE — 2500000003 HC RX 250 WO HCPCS: Performed by: NURSE ANESTHETIST, CERTIFIED REGISTERED

## 2020-02-11 PROCEDURE — 6360000002 HC RX W HCPCS: Performed by: NURSE ANESTHETIST, CERTIFIED REGISTERED

## 2020-02-11 PROCEDURE — 2709999900 HC NON-CHARGEABLE SUPPLY: Performed by: INTERNAL MEDICINE

## 2020-02-11 PROCEDURE — 2580000003 HC RX 258: Performed by: ANESTHESIOLOGY

## 2020-02-11 PROCEDURE — 88342 IMHCHEM/IMCYTCHM 1ST ANTB: CPT

## 2020-02-11 PROCEDURE — 7100000010 HC PHASE II RECOVERY - FIRST 15 MIN: Performed by: INTERNAL MEDICINE

## 2020-02-11 PROCEDURE — 3609012400 HC EGD TRANSORAL BIOPSY SINGLE/MULTIPLE: Performed by: INTERNAL MEDICINE

## 2020-02-11 PROCEDURE — 2580000003 HC RX 258: Performed by: NURSE ANESTHETIST, CERTIFIED REGISTERED

## 2020-02-11 RX ORDER — SODIUM CHLORIDE 9 MG/ML
INJECTION, SOLUTION INTRAVENOUS CONTINUOUS
Status: DISCONTINUED | OUTPATIENT
Start: 2020-02-11 | End: 2020-02-11

## 2020-02-11 RX ORDER — SODIUM CHLORIDE 0.9 % (FLUSH) 0.9 %
10 SYRINGE (ML) INJECTION PRN
Status: DISCONTINUED | OUTPATIENT
Start: 2020-02-11 | End: 2020-02-11 | Stop reason: HOSPADM

## 2020-02-11 RX ORDER — SODIUM CHLORIDE, SODIUM LACTATE, POTASSIUM CHLORIDE, CALCIUM CHLORIDE 600; 310; 30; 20 MG/100ML; MG/100ML; MG/100ML; MG/100ML
INJECTION, SOLUTION INTRAVENOUS CONTINUOUS PRN
Status: DISCONTINUED | OUTPATIENT
Start: 2020-02-11 | End: 2020-02-11 | Stop reason: SDUPTHER

## 2020-02-11 RX ORDER — SODIUM CHLORIDE, SODIUM LACTATE, POTASSIUM CHLORIDE, CALCIUM CHLORIDE 600; 310; 30; 20 MG/100ML; MG/100ML; MG/100ML; MG/100ML
INJECTION, SOLUTION INTRAVENOUS CONTINUOUS
Status: DISCONTINUED | OUTPATIENT
Start: 2020-02-11 | End: 2020-02-11 | Stop reason: HOSPADM

## 2020-02-11 RX ORDER — SODIUM CHLORIDE 0.9 % (FLUSH) 0.9 %
10 SYRINGE (ML) INJECTION EVERY 12 HOURS SCHEDULED
Status: DISCONTINUED | OUTPATIENT
Start: 2020-02-11 | End: 2020-02-11 | Stop reason: HOSPADM

## 2020-02-11 RX ORDER — LIDOCAINE HYDROCHLORIDE 10 MG/ML
1 INJECTION, SOLUTION EPIDURAL; INFILTRATION; INTRACAUDAL; PERINEURAL
Status: DISCONTINUED | OUTPATIENT
Start: 2020-02-11 | End: 2020-02-11 | Stop reason: HOSPADM

## 2020-02-11 RX ORDER — PROPOFOL 10 MG/ML
INJECTION, EMULSION INTRAVENOUS PRN
Status: DISCONTINUED | OUTPATIENT
Start: 2020-02-11 | End: 2020-02-11 | Stop reason: SDUPTHER

## 2020-02-11 RX ORDER — LIDOCAINE HYDROCHLORIDE 20 MG/ML
INJECTION, SOLUTION INFILTRATION; PERINEURAL PRN
Status: DISCONTINUED | OUTPATIENT
Start: 2020-02-11 | End: 2020-02-11 | Stop reason: SDUPTHER

## 2020-02-11 RX ORDER — FENTANYL CITRATE 50 UG/ML
INJECTION, SOLUTION INTRAMUSCULAR; INTRAVENOUS PRN
Status: DISCONTINUED | OUTPATIENT
Start: 2020-02-11 | End: 2020-02-11 | Stop reason: SDUPTHER

## 2020-02-11 RX ADMIN — SODIUM CHLORIDE, POTASSIUM CHLORIDE, SODIUM LACTATE AND CALCIUM CHLORIDE: 600; 310; 30; 20 INJECTION, SOLUTION INTRAVENOUS at 09:36

## 2020-02-11 RX ADMIN — SODIUM CHLORIDE, POTASSIUM CHLORIDE, SODIUM LACTATE AND CALCIUM CHLORIDE: 600; 310; 30; 20 INJECTION, SOLUTION INTRAVENOUS at 09:00

## 2020-02-11 RX ADMIN — PROPOFOL 70 MG: 10 INJECTION, EMULSION INTRAVENOUS at 09:39

## 2020-02-11 RX ADMIN — LIDOCAINE HYDROCHLORIDE 60 MG: 20 INJECTION, SOLUTION INFILTRATION; PERINEURAL at 09:39

## 2020-02-11 RX ADMIN — Medication 100 MCG: at 09:39

## 2020-02-11 ASSESSMENT — PULMONARY FUNCTION TESTS
PIF_VALUE: 1

## 2020-02-11 ASSESSMENT — PAIN SCALES - GENERAL
PAINLEVEL_OUTOF10: 0

## 2020-02-11 ASSESSMENT — PAIN - FUNCTIONAL ASSESSMENT: PAIN_FUNCTIONAL_ASSESSMENT: 0-10

## 2020-02-11 NOTE — ANESTHESIA PRE PROCEDURE
Department of Anesthesiology  Preprocedure Note       Name:  Basil Garcia   Age:  47 y.o.  :  1970                                          MRN:  [id number]         Date:  2020      Surgeon: Keshia Granger):  Shani Leyva MD    Procedure: COLORECTAL CANCER SCREENING, NOT HIGH RISK (N/A )  EGD ESOPHAGOGASTRODUODENOSCOPY (N/A )    Medications prior to admission:   Prior to Admission medications    Medication Sig Start Date End Date Taking? Authorizing Provider   lactulose (CHRONULAC) 10 GM/15ML solution Take 15 mLs by mouth 2 times daily 1/15/20  Yes Shani Leyva MD   omeprazole (PRILOSEC) 40 MG delayed release capsule Take 1 capsule by mouth daily 1/15/20  Yes Shani Leyva MD   senna-docusate (PERICOLACE) 8.6-50 MG per tablet Take 1 tablet by mouth daily as needed for Constipation 19   Shani Leyva MD   fluticasone Sherleen Sox) 50 MCG/ACT nasal spray 1 spray by Each Nare route daily 1 Spray in each nostril 3/7/19   Isaac West MD       Current medications:    Current Facility-Administered Medications   Medication Dose Route Frequency Provider Last Rate Last Dose    lactated ringers infusion   Intravenous Continuous Shelli Trivedi MD        sodium chloride flush 0.9 % injection 10 mL  10 mL Intravenous 2 times per day Shelli Trivedi MD        sodium chloride flush 0.9 % injection 10 mL  10 mL Intravenous PRN Shelli Trivedi MD        lidocaine PF 1 % injection 1 mL  1 mL Intradermal Once PRN Shelli Trivedi MD           Allergies: Allergies   Allergen Reactions    Norco [Hydrocodone-Acetaminophen] Other (See Comments)     Upset stomach       Problem List:    Patient Active Problem List   Diagnosis Code    Generalized anxiety disorder F41.1    Menopause syndrome N95.1    Overweight (BMI 25.0-29. 9) E66.3    Hot flashes due to menopause N95.1    Acute recurrent maxillary sinusitis J01.01       Past Medical History:        Diagnosis Date    Acid reflux        Past Surgical History: Procedure Laterality Date    CHOLECYSTECTOMY      HYSTERECTOMY      TUBAL LIGATION      WRIST FRACTURE SURGERY Right     at 9years old       Social History:    Social History     Tobacco Use    Smoking status: Never Smoker    Smokeless tobacco: Never Used   Substance Use Topics    Alcohol use: Not Currently                                Counseling given: Not Answered      Vital Signs (Current):   Vitals:    02/11/20 0836 02/11/20 0850   BP: 96/77    Pulse: 70    Resp: 16    Temp: 97.6 °F (36.4 °C)    TempSrc: Oral    SpO2: 100%    Weight:  142 lb 12.8 oz (64.8 kg)   Height:  5' 2\" (1.575 m)                                              BP Readings from Last 3 Encounters:   02/11/20 96/77   01/15/20 124/79   11/06/19 119/80       NPO Status: Time of last liquid consumption: 2300                        Time of last solid consumption: 1800                        Date of last liquid consumption: 02/10/20                        Date of last solid food consumption: 02/09/20    BMI:   Wt Readings from Last 3 Encounters:   02/11/20 142 lb 12.8 oz (64.8 kg)   01/15/20 146 lb (66.2 kg)   11/06/19 153 lb (69.4 kg)     Body mass index is 26.12 kg/m². CBC:   Lab Results   Component Value Date    WBC 5.0 11/06/2019    RBC 4.73 11/06/2019    HGB 13.5 11/06/2019    HCT 41.7 11/06/2019    MCV 88.2 11/06/2019    RDW 12.9 11/06/2019     11/06/2019       CMP:   Lab Results   Component Value Date     10/16/2019    K 3.8 10/16/2019     10/16/2019    CO2 22 10/16/2019    BUN 12 10/16/2019    CREATININE 0.85 10/16/2019    GFRAA >60 10/16/2019    LABGLOM >60 10/16/2019    GLUCOSE 101 10/16/2019    PROT 7.0 11/06/2019    CALCIUM 9.3 10/16/2019    BILITOT 0.33 11/06/2019    ALKPHOS 64 11/06/2019    AST 25 11/06/2019    ALT 21 11/06/2019       POC Tests: No results for input(s): POCGLU, POCNA, POCK, POCCL, POCBUN, POCHEMO, POCHCT in the last 72 hours.     Coags: No results found for: PROTIME, INR, APTT    HCG (If Applicable): No results found for: PREGTESTUR, PREGSERUM, HCG, HCGQUANT     ABGs: No results found for: PHART, PO2ART, SWA5FWM, FHU3SVJ, BEART, N3FYIGVX     Type & Screen (If Applicable):  No results found for: LABABO, [address]    Anesthesia Evaluation  Patient summary reviewed and Nursing notes reviewed no history of anesthetic complications:   Airway: Mallampati: II  TM distance: >3 FB   Neck ROM: full  Mouth opening: > = 3 FB Dental: normal exam         Pulmonary:normal exam        (-) COPD and asthma                           Cardiovascular:  Exercise tolerance: no interval change,       (-) hypertension, past MI, CAD and CABG/stent        Rate: normal                    Neuro/Psych:   (+) psychiatric history:            GI/Hepatic/Renal:   (+) GERD:,      (-) hepatitis       Endo/Other:                     Abdominal:           Vascular:                                        Anesthesia Plan      MAC and general     ASA 2       Induction: intravenous. Anesthetic plan and risks discussed with patient. Plan discussed with CRNA.     Attending anesthesiologist reviewed and agrees with Pre Eval content              Luis Armando Yates DO   2/11/2020

## 2020-02-11 NOTE — OP NOTE
[facility]    STA ENDOSCOPY     EGD    PROCEDURE DATE: 02/11/20    REFERRING PHYSICIAN: No ref. provider found     PRIMARY CARE PROVIDER: Zaire Angel MD    ATTENDING PHYSICIAN: Dora Sharma MD     HISTORY: Ms. Sybil Hoover is a 47 y.o. female who presents to the [facility] Endoscopy unit for upper endoscopy. The patient's clinical history is remarkable for chronic constipation, MINNA, dyspepsia, referred for dx EGD and screening colonsocopy. She is currently medically stable and appropriate for the planned procedure. PREOPERATIVE DIAGNOSIS: dyspepsia. PROCEDURES:   1) Transoral Upper Endoscopy with cold biopsy of the stomach. POSTOPERATIVE DIAGNOSIS:    Mild non-specific antritis     MEDICATIONS:   MAC per anesthesia     EBL:  <10cc    INSTRUMENT: Olympus GIF-H180  flexible Gastroscope. PREPARATION: The nature and character of the procedure as well as risks, benefits, and alternatives were discussed with the patient and informed consent was obtained. Complications were said to include, but were not limited to: medication allergy, medication reaction, cardiovascular and respiratory problems, bleeding, perforation, infection, and/or missed diagnosis. Following arrival in the endoscopy room, the patient was placed in the left lateral decubitus position and final time-out accomplished in the presence of the nursing staff. Baseline vital signs were obtained and reviewed, and IV sedation was subsequently initiated. FINDINGS:   Esophagus: The esophagus was inspected to the Z-line. The endoscopic exam showed normal appearing esophagus and GEJ. Stomach: The stomach was inspected in both forward and retroflex fashion and was appropriately distensible. The cardia, fundus, incisura, antrum and pylorus were identified via direct visualization. The endoscopic exam showed mild antritis s/p cold biopsy to evaluate for H. Pylori.      Duodenum: The proximal small bowel was inspected through the bulb, sweep, and second portion of the duodenum. The endoscopic exam showed normal findings. IMPRESSION:    Mild non-specific antritis     RECOMMENDATIONS:   1) Follow up with referring provider, as previously scheduled. 2) proceed with colonoscopy       [facility]           [facility]     [facility] ENDOSCOPY     COLONOSCOPY    PROCEDURE DATE: 02/11/20    REFERRING PHYSICIAN: No ref. provider found     PRIMARY CARE PROVIDER: Cathleen Borrero MD    ATTENDING PHYSICIAN: Tanja Lugo MD     HISTORY: Ms. Quintin Gonzalez is a 47 y.o. female who presents to the [facility] endoscopy unit for colonoscopy. The patient's clinical history is remarkable for history as detailed above. She is currently medically stable and appropriate for the planned procedure. PREOPERATIVE DIAGNOSIS: constipation. PROCEDURES:   Transanal Colonoscopy --screening. POSTPROCEDURE DIAGNOSIS:    FAIR PREP     Moderate external and internal hemorrhoids  No large polyps, lesions, or concerning mucosal findings    MEDICATIONS:     MAC per anesthesia     EBL <10cc    INSTRUMENT: Olympus CF-H180 AL Pediatric flexible Colonoscope. PREPARATION: The nature and character of the procedure as well as risks, benefits, and alternatives were discussed with the patient and informed consent was obtained. Complications were said to include, but were not limited to: medication allergy, medication reaction, cardiovascular and respiratory problems, bleeding, perforation, infection, and/or missed diagnosis. Following arrival in the endoscopy room, the patient was placed in the left lateral decubitus position and final time-out accomplished in the presence of the nursing staff. Baseline vital signs were obtained and reviewed, and IV sedation was subsequently initiated.        FINDINGS: Rectal examination demonstrated no significant visible external abnormality and digital palpation was unremarkable. Following adequate conscious sedation the colonoscope was introduced and advanced under direct visualization to the cecum, which was identified by the ileocecal valve and appendiceal orifice. The bowel preparation was felt to be FAIR. This included moderate amounts of green, thick stool that was mostly able to be adequately irrigated and aspirated. Cecal intubation time was 9 minutes. Once maximally inserted, the endoscope was withdrawn and the mucosa was carefully inspected. The mucosal exam was revealed no large lesions or polyps. Retroflexion was performed in the rectum and moderate internal hemorrhoids. Withdrawal time was 23 minutes. IMPRESSION:      FAIR PREP     Moderate external and internal hemorrhoids  No large polyps, lesions, or concerning mucosal findings    RECOMMENDATIONS:   1) Follow up with referring provider, as previously scheduled.    2) Repeat Colonoscopy in 5 yrs, follow up in GI clinic        [facility]

## 2020-02-11 NOTE — ANESTHESIA POSTPROCEDURE EVALUATION
Department of Anesthesiology  Postprocedure Note    Patient: Stephanie Mckeon  MRN: [id number]  YOB: 1970  Date of evaluation: 2/11/2020  Time:  1:59 PM     Procedure Summary     Date:  02/11/20 Room / Location:  [place] [address]    Anesthesia Start:  6408 Anesthesia Stop:  1008    Procedures:       COLORECTAL CANCER SCREENING, NOT HIGH RISK (N/A )      EGD BIOPSY Diagnosis:  (DX DYSPEPSIA, CONSTIPATION)    Surgeon:  Emilia Tran MD Responsible Provider:  Kadi Vera DO    Anesthesia Type:  MAC, general ASA Status:  2          Anesthesia Type: MAC, general    Brisa Phase I: Brisa Score: 10    Brisa Phase II: Brisa Score: 8    Last vitals: Reviewed and per EMR flowsheets.        Anesthesia Post Evaluation    Patient location during evaluation: PACU  Patient participation: complete - patient participated  Level of consciousness: awake and alert  Airway patency: patent  Nausea & Vomiting: no nausea and no vomiting  Complications: no  Cardiovascular status: hemodynamically stable  Respiratory status: acceptable  Hydration status: stable

## 2020-02-11 NOTE — H&P
History and Physical GI Update    Pt Name: Elvin Bustos  MRN: [id number]  YOB: 1970  Date of evaluation: 2/11/2020      [x] I have reviewed the Progress note found in [facility] dated 1/15/20 by Dr. Alyson Sandoval which meets the criteria for an Interval History and Physical note and is attached below. [x] I have examined  Elvin Bustos and there are no changes to the patient or plans for a Colonoscopy and EGD. by Dr Dahiana Williamson for constipation , colon cancer screening, GERD and nausea and vomiting . Bowel Prep completed: Yes with clear yellow results. Last colonoscopy none. Denies history of ulcers, hiatal hernia,  or IBS. Previous EGD many years ago. Patient today denies fever, chills, night sweats, pain or unexplained weight loss. YES acid reflux/GERD,    Vital signs: BP 96/77   Pulse 70   Temp 97.6 °F (36.4 °C) (Oral)   Resp 16   Ht 5' 2\" (1.575 m)   Wt 142 lb 12.8 oz (64.8 kg)   SpO2 100%   BMI 26.12 kg/m²     Allergies:  Norco [hydrocodone-acetaminophen]    Medications:   No current facility-administered medications on file prior to encounter. Current Outpatient Medications on File Prior to Encounter   Medication Sig Dispense Refill    lactulose (CHRONULAC) 10 GM/15ML solution Take 15 mLs by mouth 2 times daily 1 Bottle 1    omeprazole (PRILOSEC) 40 MG delayed release capsule Take 1 capsule by mouth daily 30 capsule 3    senna-docusate (PERICOLACE) 8.6-50 MG per tablet Take 1 tablet by mouth daily as needed for Constipation 30 tablet 2    fluticasone (FLONASE) 50 MCG/ACT nasal spray 1 spray by Each Nare route daily 1 Spray in each nostril 2 Bottle 1            Prior to Admission medications    Medication Sig Start Date End Date Taking?  Authorizing Provider   lactulose (CHRONULAC) 10 GM/15ML solution Take 15 mLs by mouth 2 times daily 1/15/20  Yes Dahiana Williamson MD   omeprazole (PRILOSEC) 40 MG delayed release capsule Take 1 capsule by mouth daily 1/15/20  Yes Dahiana Williamson MD senna-docusate (PERICOLACE) 8.6-50 MG per tablet Take 1 tablet by mouth daily as needed for Constipation 11/6/19   Olive Hawley MD   fluticasone [facility]) 50 MCG/ACT nasal spray 1 spray by Each Nare route daily 1 Spray in each nostril 3/7/19   Dinah Rios MD       This is a 47 y.o. female who is  pleasant, cooperative, alert and oriented x3, in no acute distress. Heart: Heart regular rate and rhythm   Lungs:clear to auscultation without wheezes or rales    Abdomen: soft, nontender, nondistended, no masses or organomegaly    Labs:  No results for input(s): HGB, HCT, WBC, MCV, PLATELET, NA, K, CL, CO2, BUN, CREATININE, GLUCOSE, INR, PROTIME, APTT, AST, ALT, LABALBU, HCG in the last 720 hours. CHATA Leon,   Electronically signed 2/11/2020 at 9:03 AM                                                                                                   GI CLINIC FOLLOW UP     INTERVAL HISTORY:   No referring provider defined for this encounter. Chief Complaint   Patient presents with    Follow-up       6 wk lab follow up. Patient states still being constipated even though she takes extra of her medication. She tries not to take fiber. She states currently not having a BM since yesterday but she feels very backed up. She states straining a lot and having hemorrhoids               HISTORY OF PRESENT ILLNESS: Anamaria Ordoñez is a 47 y.o. female with a pasthistory remarkable for history of chronic constipation, history MINNA, referred for evaluation of and treatment of her constipation. She appears to be dependent on Senna glycoside/Ex-lax to induce bowel movements  She exercises on the regular  Adequate hydration  Non-smoker  Non-drinker. Past Medical,Family, and Social History reviewed and does contribute to the patient presenting condition. Patient's PMH/PSH,SH,PSYCH Hx, MEDs, ALLERGIES, and ROS were all reviewed and updated in the appropriate sections.      PAST MEDICAL organizations: Not on file       Relationship status: Not on file    Intimate partner violence:       Fear of current or ex partner: Not on file       Emotionally abused: Not on file       Physically abused: Not on file       Forced sexual activity: Not on file   Other Topics Concern    Not on file   Social History Narrative    Not on file            REVIEW OF SYSTEMS: A 12-point review of systems was obtained and pertinent positives and negatives were listed below. REVIEW OF SYSTEMS:     Constitutional: No fever, no chills, no lethargy, no weakness. HEENT:           No headache, otalgia, itchy eyes, nasal discharge or sore throat. Cardiac:           No chest pain, dyspnea, orthopnea or PND. Chest:              No cough, phlegm or wheezing. Abdomen:      Detailed by MA   Neuro:             No focal weakness, abnormal movements or seizure like activity. Skin:                No rashes, no itching. :                  No hematuria, no pyuria, no dysuria, no flank pain. Extremities:     No swelling or joint pains. ROS was otherwise negative     Review of Systems     PHYSICAL EXAMINATION: Vital signs reviewed per the nursing documentation. /79   Pulse 64   Wt 146 lb (66.2 kg)   BMI 26.70 kg/m²   Body mass index is 26.7 kg/m². Physical Exam     Physical Exam   Constitutional: Patient is oriented to person, place, and time. Patient appears well-developed and well-nourished. HENT:   Head: Normocephalic and atraumatic. Eyes: Pupils are equal, round, and reactive to light. EOM are normal.   Neck: Normal range of motion. Neck supple. No JVD present. No tracheal deviation present. No thyromegaly present. Cardiovascular: Normal rate, regular rhythm, normal heart sounds and intact distal pulses. Pulmonary/Chest: Effort normal and breath sounds normal. No stridor. No respiratory distress. He has no wheezes. He has no rales. He exhibits no tenderness. Abdominal: Soft.  Bowel sounds are normal. He exhibits no distension and no mass. There is no tenderness. There is no rebound and no guarding. No hernia. Musculoskeletal: Normal range of motion. Lymphadenopathy:    Patient has no cervical adenopathy. Neurological: Patient is alert and oriented to person, place, and time. Psychiatric: Patient has a normal mood and affect. Patient behavior is normal.         LABORATORY DATA: Reviewed        Lab Results   Component Value Date     WBC 5.0 11/06/2019     HGB 13.5 11/06/2019     HCT 41.7 11/06/2019     MCV 88.2 11/06/2019      11/06/2019      10/16/2019     K 3.8 10/16/2019      10/16/2019     CO2 22 10/16/2019     BUN 12 10/16/2019     CREATININE 0.85 10/16/2019     LABALBU 4.4 11/06/2019     BILITOT 0.33 11/06/2019     ALKPHOS 64 11/06/2019     AST 25 11/06/2019     ALT 21 11/06/2019                  Lab Results   Component Value Date     RBC 4.73 11/06/2019     HGB 13.5 11/06/2019     MCV 88.2 11/06/2019     MCH 28.5 11/06/2019     MCHC 32.4 11/06/2019     RDW 12.9 11/06/2019     MPV 11.8 11/06/2019     BASOPCT 1 11/06/2019     LYMPHSABS 3.09 11/06/2019     MONOSABS 0.40 11/06/2019     NEUTROABS 1.43 (L) 11/06/2019     EOSABS 0.03 11/06/2019     BASOSABS 0.03 11/06/2019            DIAGNOSTIC TESTING:      No results found. IMPRESSION:  Mishel Ramos is a 47 y.o. female with history of MINNA, chronic constipation, dependent on PO laxatives, referred for evaluation and treatment of her constipation. Chronic constipation--approximately 7 yrs, 1 bowel movement per week, Exlax currently being used, dependent x 2 yrs, on fiber supplementation, previously on stool softeners     Prilosec changed to 40mg daily, currently taking 20mg prn.  2-3 bottles of water is routinely ingested, appears to be adequate  Will add lactulose   Pericolace BID  Plan for EGD and Colonoscopy, dx testing. RBA explained. Thank you for allowing me to participate in the care of Ms. Lane.  For any further questions please do not hesitate to contact me. I have reviewed and agree with the ROS entered by the MA/VAUGHNN.             Emilia Tran MD, MPH   [facility]  Office #: [phone]      Revision History

## 2020-02-13 LAB — SURGICAL PATHOLOGY REPORT: NORMAL

## 2020-09-25 ENCOUNTER — OFFICE VISIT (OUTPATIENT)
Dept: FAMILY MEDICINE CLINIC | Age: 50
End: 2020-09-25
Payer: COMMERCIAL

## 2020-09-25 VITALS
TEMPERATURE: 98.8 F | BODY MASS INDEX: 24.29 KG/M2 | HEART RATE: 84 BPM | WEIGHT: 132 LBS | HEIGHT: 62 IN | DIASTOLIC BLOOD PRESSURE: 89 MMHG | SYSTOLIC BLOOD PRESSURE: 125 MMHG

## 2020-09-25 PROBLEM — M25.562 ACUTE PAIN OF LEFT KNEE: Status: ACTIVE | Noted: 2020-09-25

## 2020-09-25 PROCEDURE — 3017F COLORECTAL CA SCREEN DOC REV: CPT | Performed by: STUDENT IN AN ORGANIZED HEALTH CARE EDUCATION/TRAINING PROGRAM

## 2020-09-25 PROCEDURE — G8420 CALC BMI NORM PARAMETERS: HCPCS | Performed by: STUDENT IN AN ORGANIZED HEALTH CARE EDUCATION/TRAINING PROGRAM

## 2020-09-25 PROCEDURE — G8427 DOCREV CUR MEDS BY ELIG CLIN: HCPCS | Performed by: STUDENT IN AN ORGANIZED HEALTH CARE EDUCATION/TRAINING PROGRAM

## 2020-09-25 PROCEDURE — 1036F TOBACCO NON-USER: CPT | Performed by: STUDENT IN AN ORGANIZED HEALTH CARE EDUCATION/TRAINING PROGRAM

## 2020-09-25 PROCEDURE — 99213 OFFICE O/P EST LOW 20 MIN: CPT | Performed by: STUDENT IN AN ORGANIZED HEALTH CARE EDUCATION/TRAINING PROGRAM

## 2020-09-25 RX ORDER — NAPROXEN 500 MG/1
500 TABLET ORAL 2 TIMES DAILY WITH MEALS
COMMUNITY
End: 2021-01-21 | Stop reason: SDUPTHER

## 2020-09-25 ASSESSMENT — ENCOUNTER SYMPTOMS
ABDOMINAL PAIN: 0
NAUSEA: 0
COUGH: 0
DIARRHEA: 0
SHORTNESS OF BREATH: 0
CHEST TIGHTNESS: 0
BACK PAIN: 0
VOMITING: 0
WHEEZING: 0

## 2020-09-25 ASSESSMENT — PATIENT HEALTH QUESTIONNAIRE - PHQ9
2. FEELING DOWN, DEPRESSED OR HOPELESS: 0
SUM OF ALL RESPONSES TO PHQ QUESTIONS 1-9: 0
SUM OF ALL RESPONSES TO PHQ9 QUESTIONS 1 & 2: 0
1. LITTLE INTEREST OR PLEASURE IN DOING THINGS: 0
SUM OF ALL RESPONSES TO PHQ QUESTIONS 1-9: 0

## 2020-09-25 NOTE — PATIENT INSTRUCTIONS
Thank you for letting us take care of you today. We hope all your questions were addressed. If a question was overlooked or something else comes to mind after you return home, please contact a member of your Care Team listed below. Your Care Team at [place] [address] is Team #4  Ivna Conway MD (Faculty)  Christina Carpio MD (Resident)  Chaim Wyatt MD (Resident)  Lilian Hutson MD (Resident)  Konstantin Cuba MD (Resident)  FELECIA Gan,JOCY Posada[address] [facility] office)  Juju Wynn, [address] (Clinical Practice Manager)  Skylar MorrisseyKaiser [facility] (Clinical Pharmacist)       Office phone number: [phone]    If you need to get in right away due to illness, please be advised we have \"Same Day\" appointments available Monday-Friday. Please call us at [phone] option #3 to schedule your \"Same Day\" appointment.

## 2020-09-25 NOTE — PROGRESS NOTES
Visit Information    Have you changed or started any medications since your last visit including any over-the-counter medicines, vitamins, or herbal medicines? no   Have you stopped taking any of your medications? Is so, why? -  no  Are you having any side effects from any of your medications? - no    Have you seen any other physician or provider since your last visit?  no   Have you had any other diagnostic tests since your last visit?  no   Have you been seen in the emergency room and/or had an admission in a hospital since we last saw you?  no   Have you had your routine dental cleaning in the past 6 months?  no     Do you have an active MyChart account? If no, what is the barrier?   Yes    Patient Care Team:  Yesika Zarate MD as PCP - General (Family Medicine)  Emmanuelle Benavidez MD as Consulting Physician (Gastroenterology)    Medical History Review  Past Medical, Family, and Social History reviewed and does not contribute to the patient presenting condition    Health Maintenance   Topic Date Due    Flu vaccine (1) 09/01/2020    Shingles Vaccine (1 of 2) 10/16/2020 (Originally 3/15/2019)    Cervical cancer screen  03/13/2024 (Originally 3/15/1990)    Breast cancer screen  09/30/2021    Lipid screen  08/08/2023    DTaP/Tdap/Td vaccine (2 - Td) 09/10/2028    Colon cancer screen colonoscopy  02/11/2030    HIV screen  Completed    Hepatitis A vaccine  Aged Out    Hepatitis B vaccine  Aged Out    Hib vaccine  Aged Out    Meningococcal (ACWY) vaccine  Aged Out    Pneumococcal 0-64 years Vaccine  Aged Out

## 2020-09-25 NOTE — LETTER
[address]  [address]  [place] [address]  Phone: [phone]  Fax: [phone]    Chad Lunsford MD        September 25, 2020     Patient: Srini Bland   YOB: 1970   Date of Visit: 9/25/2020       To Whom it May Concern:    Isaak Nicholson was seen in my clinic on 9/25/2020. She may return to work on 9/30/2020. If you have any questions or concerns, please don't hesitate to call.     Sincerely,         Chad Lunsford MD

## 2020-09-25 NOTE — PROGRESS NOTES
Subjective:      Patient ID: Winston Bojorquez is a 45 y.o. female. Pt presents for acute L knee pain  On Sunday patient did new work-out, weight resistance, high impact  Denies moment of trauma, but next morning had L knee swelling, pain worsening to 7-8/10 aching without radiation  Pt went on Tuesday to urgent care, had knee XR denies any fractures, do not have images available  Pt was given knee compression sleeve, has been wearing currently  Gait is limited, pt cannot work her  job at this time, Requests work letter  Amenable to be seen by specialist  Denies any systemic signs, no fever/chills, able to bear weight but does take weight off the R knee, walking more on toes, denies any falls or any further trauma    Review of Systems   Constitutional: Negative for appetite change, chills, fever and unexpected weight change. Respiratory: Negative for cough, chest tightness, shortness of breath and wheezing. Cardiovascular: Negative for chest pain, palpitations and leg swelling. Gastrointestinal: Negative for abdominal pain, diarrhea, nausea and vomiting. Genitourinary: Negative for difficulty urinating, dysuria, flank pain and frequency. Musculoskeletal: Positive for arthralgias, gait problem and joint swelling. Negative for back pain, myalgias and neck pain. Skin: Negative for rash. Neurological: Negative for dizziness, syncope, weakness and headaches. Objective:   Physical Exam  Vitals signs reviewed. Constitutional:       General: She is not in acute distress. Appearance: She is well-developed. She is not diaphoretic. HENT:      Head: Normocephalic and atraumatic. Cardiovascular:      Rate and Rhythm: Normal rate and regular rhythm. Heart sounds: Normal heart sounds. No murmur. No gallop. Pulmonary:      Effort: Pulmonary effort is normal. No respiratory distress. Breath sounds: Normal breath sounds. No wheezing or rales.    Abdominal:      General: Bowel sounds are normal.      Palpations: Abdomen is soft. Tenderness: There is no abdominal tenderness. There is no guarding. Musculoskeletal: Normal range of motion. General: No tenderness. Right lower leg: No edema. Left lower leg: No edema. Comments: Tenderness (mild) to palpation of L knee  Flexion/extension limited by pain  No resistance of passive flexion/extension  No joint laxity, pt refuses anterior/posterior drawer test  No obvious effusion on palpation, No erythema/heat on joint palpation, no tracking up leg   Skin:     General: Skin is warm and dry. Capillary Refill: Capillary refill takes less than 2 seconds. Neurological:      Mental Status: She is alert and oriented to person, place, and time. Cranial Nerves: No cranial nerve deficit. Psychiatric:      Comments: Pt appears anxious       Vitals:    09/25/20 1554   BP: 125/89   Site: Left Upper Arm   Position: Sitting   Cuff Size: Medium Adult   Pulse: 84   Temp: 98.8 °F (37.1 °C)   TempSrc: Temporal   Weight: 132 lb (59.9 kg)   Height: 5' 2\" (1.575 m)       Assessment:       Diagnosis Orders   1.  Acute pain of left knee           Plan:      - C/w NSAID - naproxen, and icing of knee, compression, elevation, modified activity  - No indication of infectious/septic joint at this time, no red flag sxs at this time, no effusion for arthrocentesis at this time  - Pt to sign release of records for XR knee from urgent care  - f/u with Dr Jackie Farr sports medicine next  - Letter of work exemption given until then, next Wednesday  - f/u 2-4 weeks w/ PCP        Thurmond Merlin, MD

## 2020-09-30 ENCOUNTER — OFFICE VISIT (OUTPATIENT)
Dept: FAMILY MEDICINE CLINIC | Age: 50
End: 2020-09-30
Payer: COMMERCIAL

## 2020-09-30 VITALS
BODY MASS INDEX: 28.72 KG/M2 | WEIGHT: 157 LBS | HEART RATE: 94 BPM | DIASTOLIC BLOOD PRESSURE: 83 MMHG | SYSTOLIC BLOOD PRESSURE: 131 MMHG | TEMPERATURE: 98.1 F

## 2020-09-30 PROCEDURE — 1036F TOBACCO NON-USER: CPT | Performed by: FAMILY MEDICINE

## 2020-09-30 PROCEDURE — 99213 OFFICE O/P EST LOW 20 MIN: CPT | Performed by: FAMILY MEDICINE

## 2020-09-30 PROCEDURE — G8419 CALC BMI OUT NRM PARAM NOF/U: HCPCS | Performed by: FAMILY MEDICINE

## 2020-09-30 PROCEDURE — 3017F COLORECTAL CA SCREEN DOC REV: CPT | Performed by: FAMILY MEDICINE

## 2020-09-30 PROCEDURE — G8427 DOCREV CUR MEDS BY ELIG CLIN: HCPCS | Performed by: FAMILY MEDICINE

## 2020-09-30 PROCEDURE — 99211 OFF/OP EST MAY X REQ PHY/QHP: CPT | Performed by: FAMILY MEDICINE

## 2020-09-30 ASSESSMENT — ENCOUNTER SYMPTOMS
NAUSEA: 0
CONSTIPATION: 0
SHORTNESS OF BREATH: 0
SORE THROAT: 0
ABDOMINAL PAIN: 0
VOMITING: 0
CHEST TIGHTNESS: 0
DIARRHEA: 0
COUGH: 0

## 2020-09-30 NOTE — LETTER
[address]  [address]  [facility] [address]  Phone: [phone]  Fax: [address] Lambert, DO        September 30, 2020     Patient: Stephani Andrews   YOB: 1970   Date of Visit: 9/30/2020       To Whom It May Concern: It is my medical opinion that Osvaldo Nolen may return to work on Monday October 5th 2020 with no restrictions. If you have any questions or concerns, please don't hesitate to call.     Sincerely,        Fausto Rao, DO

## 2020-09-30 NOTE — PROGRESS NOTES
Visit Information    Have you changed or started any medications since your last visit including any over-the-counter medicines, vitamins, or herbal medicines? no   Have you stopped taking any of your medications? Is so, why? -  no  Are you having any side effects from any of your medications? - no    Have you seen any other physician or provider since your last visit?  no   Have you had any other diagnostic tests since your last visit?  no   Have you been seen in the emergency room and/or had an admission in a hospital since we last saw you?  no   Have you had your routine dental cleaning in the past 6 months?  no     Do you have an active MyChart account? If no, what is the barrier?   No: declined    Patient Care Team:  Dre Gold MD as PCP - General (Family Medicine)  Kalpesh Harris MD as Consulting Physician (Gastroenterology)    Medical History Review  Past Medical, Family, and Social History reviewed and does not contribute to the patient presenting condition    Health Maintenance   Topic Date Due    Flu vaccine (1) 09/01/2020    Shingles Vaccine (1 of 2) 10/16/2020 (Originally 3/15/2019)    Cervical cancer screen  03/13/2024 (Originally 3/15/1990)    Breast cancer screen  09/30/2021    Lipid screen  08/08/2023    DTaP/Tdap/Td vaccine (2 - Td) 09/10/2028    Colon cancer screen colonoscopy  02/11/2030    HIV screen  Completed    Hepatitis A vaccine  Aged Out    Hepatitis B vaccine  Aged Out    Hib vaccine  Aged Out    Meningococcal (ACWY) vaccine  Aged Out    Pneumococcal 0-64 years Vaccine  Aged Out

## 2020-09-30 NOTE — PROGRESS NOTES
Subjective:    Valentine Vera is a 45 y.o. female with  has a past medical history of Acid reflux. Family History   Problem Relation Age of Onset    Colon Cancer Maternal Grandmother        Presented tothe office today for:  Chief Complaint   Patient presents with    Knee Pain     patient states she has left knee pain, has been going on since 9/20. Has some swelling sometimes, using ibuprofen for treatment,        HPI    Chief Complaint: left knee pain  When did it happen? Sept 6th  Location: medial  Intensity: 5/10  Quality: sharp  Radiation: no bowel or bladder incontinence  Getting better, worse or staying the same? better  Aggravating: walking  Associated: cant bend and intense pain  Treatments/Medications: Ibuprofen and sleeve, ice and heat      Review of Systems   Constitutional: Negative for chills, fatigue, fever and unexpected weight change. HENT: Negative for congestion, mouth sores and sore throat. Eyes: Negative for visual disturbance. Respiratory: Negative for cough, chest tightness and shortness of breath. Cardiovascular: Negative for chest pain and leg swelling. Gastrointestinal: Negative for abdominal pain, constipation, diarrhea, nausea and vomiting. Genitourinary: Negative for difficulty urinating. Musculoskeletal: Negative for joint swelling. Left knee pain   Skin: Negative for rash. Neurological: Negative for dizziness, weakness and headaches.        Objective:    /83 (Site: Left Upper Arm, Position: Sitting, Cuff Size: Large Adult)   Pulse 94   Temp 98.1 °F (36.7 °C)   Wt 157 lb (71.2 kg)   BMI 28.72 kg/m²    BP Readings from Last 3 Encounters:   09/30/20 131/83   09/25/20 125/89   02/11/20 112/68     Physical Exam  Musculoskeletal:      Comments:  /83 (Site: Left Upper Arm, Position: Sitting, Cuff Size: Large Adult)   Pulse 94   Temp 98.1 °F (36.7 °C)   Wt 157 lb (71.2 kg)   BMI 28.72 kg/m²   GENERAL: Valentine Vera is a 45 y.o. female who is alert and oriented and sitting comfortably in our office. SKIN:  Intact without rashes, lesions or ulcerations. NEURO: Sensation to the extremity is intact. VASC:  Capillary refill is less than 3 seconds. Distal pulses are palpable. There is no lymphadenopathy. Knee Exam  Musculoskeletal/Neurologic:  Inspection-Swelling: none, Ecchymosis: no  Palpation-Tenderness:medial  Pain with patellar grind: no  ROM-normal  Strength- WNL  Sensation-normal to light touch    Special Tests-  Varus Laxity: negative   Valgus Laxity:  negative   Anterior Drawer: negative   Posterior Drawer: negative  Lachman's: negative  Quentin's:negative    PSYCH:  Good fund of knowledge and displays understanding of exam.           Lab Results   Component Value Date    WBC 5.0 11/06/2019    HGB 13.5 11/06/2019    HCT 41.7 11/06/2019     11/06/2019    CHOL 259 (H) 08/08/2018    TRIG 94 08/08/2018    HDL 98 08/08/2018    ALT 21 11/06/2019    AST 25 11/06/2019     10/16/2019    K 3.8 10/16/2019     10/16/2019    CREATININE 0.85 10/16/2019    BUN 12 10/16/2019    CO2 22 10/16/2019    TSH 0.73 09/10/2018    LABA1C 5.1 08/08/2018     Lab Results   Component Value Date    CALCIUM 9.3 10/16/2019     Lab Results   Component Value Date    LDLCHOLESTEROL 142 (H) 08/08/2018       Assessment and Plan:    1. Patellofemoral pain syndrome of left knee  - diclofenac sodium (VOLTAREN) 1 % GEL; Apply 4 g topically 4 times daily  Dispense: 2 Tube; Refill: 0  - [facility]      Requested Prescriptions     Signed Prescriptions Disp Refills    diclofenac sodium (VOLTAREN) 1 % GEL 2 Tube 0     Sig: Apply 4 g topically 4 times daily       There are no discontinued medications. No follow-ups on file. Jojo Vargas received counseling on the following healthy behaviors: nutrition and exercise  Reviewed prior labs and health maintenance  Continue current medications, diet and exercise.   Discussed use, benefit, and side effects of prescribed medications. Barriers to medication compliance addressed. Patient given educational materials - see patient instructions  Was a self-tracking handout given in paper form or via [employer]? Yes    Requested Prescriptions     Signed Prescriptions Disp Refills    diclofenac sodium (VOLTAREN) 1 % GEL 2 Tube 0     Sig: Apply 4 g topically 4 times daily       All patient questions answered. Patient voiced understanding. Quality Measures    Body mass index is 28.72 kg/m². Normal. Weight control planned discussed Healthy diet and regular exercise. BP: 131/83 Blood pressure is normal. Treatment plan consists of No treatment change needed.     Lab Results   Component Value Date    LDLCHOLESTEROL 142 (H) 08/08/2018    (goal LDL reduction with dx if diabetes is 50% LDL reduction)      PHQ Scores 9/25/2020 3/7/2019 8/8/2018   PHQ2 Score 0 0 0   PHQ9 Score 0 0 0     Interpretation of Total Score Depression Severity: 1-4 = Minimal depression, 5-9 = Mild depression, 10-14 = Moderate depression, 15-19 = Moderately severe depression, 20-27 = Severe depression

## 2020-10-01 NOTE — PROGRESS NOTES
I performed a history and physical examination of the patient and discussed management with the resident. I reviewed the residents note and agree with the documented findings and plan of care. Any areas of disagreement are noted on the chart. I have personally evaluated this patient and have completed at least one if not all key elements of the E/M (history, physical exam, and MDM). Additional findings are as noted. I agree with the chief complaint, past medical history, past surgical history, allergies, medications, social and family history as documented unless otherwise noted below.      Electronically signed by Unique Messina DO on 10/1/2020 at 11:13 AM

## 2020-10-07 ENCOUNTER — TELEPHONE (OUTPATIENT)
Dept: FAMILY MEDICINE CLINIC | Age: 50
End: 2020-10-07

## 2020-10-07 NOTE — TELEPHONE ENCOUNTER
Patient called in to seen if the forms she needed completed were done. Forms are in the PCP basket waiting for completion.

## 2020-10-08 ENCOUNTER — HOSPITAL ENCOUNTER (OUTPATIENT)
Dept: PHYSICAL THERAPY | Facility: CLINIC | Age: 50
Setting detail: THERAPIES SERIES
Discharge: HOME | End: 2020-10-08
Payer: COMMERCIAL

## 2020-10-08 PROCEDURE — 97110 THERAPEUTIC EXERCISES: CPT

## 2020-10-08 PROCEDURE — 97161 PT EVAL LOW COMPLEX 20 MIN: CPT

## 2020-10-08 NOTE — CONSULTS
[] [facility]) - [facility] &  Therapy  [address]  [phone]  F: [phone] [x] [address]  [address]   [address]  P: [phone]  F: [phone] [] [address] [facility] &  Therapy  [address] [place] [address]  P: [phone]  F: [phone] [] [address]  P: [phone]  F: [phone] [] [address]  [facility]   [address]   [place]: [phone]  F: [phone]      Physical Therapy Lower Extremity Evaluation    Date:  10/8/2020  Patient: Philipp Connors    : 1970  MRN: [id number]  Physician: Dr. Yasmin Shane MD - Resident    Dr. Casi Martinez MD    Insurance: [employer] [place]  Medical Diagnosis: left knee patellofemoral pain syndrome    Rehab Codes: M25.562, M25.662, r26.2, R60.0  Onset date: 2020    Next 's appt. : 10- Dr. Christy Little    Subjective:   CC: Patient reports anterior left knee pain worse with running and working out. Typically active lifestyle, running and lifting weights at gym     HPI: Returned to gym doing prior weights- was off a few months due to Covid -19. Likes to run on treadmill and do elliptical. Jogged outside, when down, had left knee swelling, \"couldn't move it or bear weight\" without pain. Began 2020, sore the 2 weeks prior. Currently not at the gym, had returned to work Monday after being off for 2 weeks, icing, resting. When returned to work, okay unless she has to get out of her seat. Overall it is better than initially, swelling has decreased, can stand and bear some weight on it. No prior hit, knee, or ankle injuries.      PMHx: [] Unremarkable [] Diabetes [] HTN  [] Pacemaker   [] MI/Heart Problems [] Cancer [] Arthritis [] Other:              [x] Refer to full medical chart  In EPIC []Stand    [] Walk    [] Lying    [x] Other:heat, keeping leg still  Worse: [] AM    [] PM    [] Sit    [] Rise/Sit    []Stand    [] Walk    [] Lying    [] Bend                      [] Valsalva    [x] Other moving leg, cold  Sleep: [] OK    [x] Disturbed if extends the knee (also up due to menopause)    Objective:    ROM  ° A/P STRENGTH TESTS (+/-) Left Right Not Tested    Left Right Left Right Ant.  Drawer -  []   Hip Flex wfl wlf 4 5 Post. Drawer -  []   Ext wfl wfl -4 +4 Lachmans   []   ER     Valgus Stress -  []   IR     Varus Stress -  []   ABD wfl wfl 4 4 Quentins   []   ADD wfl wfl 4 4 Apleys Comp.   []   Knee Flex 80 / 115 111 -2  Apleys Dist.   []   Ext 16 lag/ 3 lag +3 hyperextension -2  Hip Scouring -  []   Ankle DF wnl wnl   RAFAELs   []   PF     Piriformis   []   INV     Yoavs   []   EVER     Talor Tilt   []        Pat-Fem Grind   []     Knee is stable with special tests, no laxity noted  OBSERVATION No Deficit Deficit Not Tested Comments   Posture       Forward Head [x] [] []    Rounded Shoulders [x] [] []    Kyphosis [x] [] []    Lordosis [] [] []    Lateral Shift [] [] []    Scoliosis [] [] []    Iliac Crest [] [x] [] Stands with left knee flexed ~ 20 degrees, wt bearing on the ball of her foot   PSIS [x] [] []    ASIS [x] [] []    Genu Valgus [x] [] []    Genu Varus [x] [] []    Genu Recurvatum [x] [] []    Pronation [x] [] []    Supination [x] [] []    Leg Length Discrp [x] [] []    Slumped Sitting [x] [] []    Palpation [] [x] [] Tender around knee   Sensation [x] [] [] No numbness or tingling   Edema [] [x] [] Mild localized to left knee   Neurological [] [] []    Patellar Mobility [] [x] [] Limited on left > right, left with minimal mobility   Patellar Orientation [] [] []    Gait [] [x] [] Analysis: no heel strike, walking on ball of foot with knee flexed, swinging through from hip, decreased wt shift to left side         FUNCTION Normal Difficult Unable   Sitting [x] [] []   Standing [] [x] []   Ambulation [] [x] []   Groom/Dress [] [x] []   Lift/Carry [] [] [x]   Stairs [] [x] [x]   Bending [x] [] []   Squat [] [] [x]   Kneel [] [] [x]       FUNCTIONAL TESTS PAIN NO PAIN COMMENTS   Step Test 4 [] []    6 [] []    8 [] []    Squat [] []      Functional Test: LEFI Score: 75% functionally impaired     Comments:    Assessment:  Patient would benefit from skilled physical therapy services in order to: improve ROM and strength in left knee to equalize/normalize gait pattern. Increase strength in LE to resume jogging and weight training at gym for health and fitness. Problems:    [x] ? Pain:3/10 left knee  [x] ? ROM:limited flexion and extension actively of left knee  [x] ? Strength:limited by mobility deficits  [x] ? Function:pain with steps, getting stiff after sitting at work  [x] Other:  Antalgic and asymmetric gait patterning. STG: (to be met in 5 treatments)  1. ? Pain:50% in left knee  2. ? ROM: full extension to be observed by entering clinic with heel strike with gait. 3. ? Strength: +4/5 in left LE and demonstrate up and down 3 steps with 1 rail to simulate getting on and off [address] [employer] bus for work  4. Increase left knee flexion actively to 120 for improved mobility with steps and home tasks. 5. Patient to be independent with home exercise program as demonstrated by performance with correct form without cues. 6. Demonstrate Knowledge of fall prevention    LTG: (to be met in 10   treatments)  Ability to do up and down a flight of steps reciprocally  1. Ability to resume light weight strengthening at gym without increasing pain or edema in left knee. 2. No edema observed x 1 week in left knee. 3. Ability to rise after sitting 2 hours working without increased knee pain.    4. Demonstrate light job, 5 minutes on treadmill with symmetric gait mechanics                   Patient goals: exercise and return to running    Rehab Potential:  [x] Good  [] Fair  [] Poor   Suggested Professional Referral:  [x] No  [] Yes:  Barriers to Goal Achievement:  [x] No  [] Yes:  Domestic Concerns:  [x] No  [] Yes:    Pt. Education:  [x] Plans/Goals, Risks/Benefits discussed  [x] Home exercise program   10-8-020 HEP - quad sets, SLR, heel slides, hip abd sidelying, gastroc stretch, hamstring stretch, gait education (heel strike)   Method of Education: [x] Verbal  [x] Demo  [x] Written  Comprehension of Education:  [] Verbalizes understanding. [] Demonstrates understanding. [x] Needs Review. [] Demonstrates/verbalizes understanding of HEP/Ed previously given.     Treatment Plan:  [x] Therapeutic Exercise   28745  [] Iontophoresis: 4 mg/mL Dexamethasone Sodium Phosphate  mAmin  59259   [] Therapeutic Activity  81465 [] Vasopneumatic cold with compression  62759    [x] Gait Training   07308 [] Ultrasound   60828   [] Neuromuscular Re-education  45294 [] Electrical Stimulation Unattended  92998   [x] Manual Therapy  [id number] [] Electrical Stimulation Attended  34058   [x] Instruction in HEP  [] Lumbar/Cervical Traction  03106   [] Aquatic Therapy   87741 [x] Cold/hotpack    [] Massage   45454      [] Dry Needling, 1 or 2 muscles  90485   [] Biofeedback, first 15 minutes   52359  [] Biofeedback, additional 15 minutes   16382 [] Dry Needling, 3 or more muscles  06509       Frequency:  2 x/week for 12 visits      Todays Treatment:  Modalities: pt prefers heat (\"aches with ice\")  Precautions:  Exercises:  Exercise Reps/ Time Weight/ Level Comments   True Bike 7 min L3 Able to make full rotation, increased speed as progressed with time         Heel slides 15x  Orange slider         Hamstring stretch 5x 20 sec Green belt   gastroc stretch 5x 20 sec Green belt   Quad sets 15x     SLR 20     Hip Abd sidelying 20x                       Gait education   Focus on heel strike, equal weight bearing   Other:unable to keep knee extended with SLR    Specific Instructions for next treatment:review HEP, can progress with steps ups, hip all ways (band if able -bilateral)      Evaluation Complexity:  History (Personal factors, comorbidities) [] 0 [x] 1-2 [] 3+   Exam (limitations, restrictions) [x] 1-2 [] 3 [] 4+   Clinical presentation (progression) [x] Stable [] Evolving  [] Unstable   Decision Making [x] Low [] Moderate [] High    [x] Low Complexity [] Moderate Complexity [] High Complexity       Treatment Charges: Mins Units   [x] Evaluation       [x]  Low       []  Moderate       []  High 25 1   []  Modalities     [x]  Ther Exercise 25 1   []  Manual Therapy     []  Ther Activities     []  Aquatics     []  Vasocompression     []  Other       TOTAL TREATMENT TIME: 50 min    Time in: 3:00 pm   Time Out:3: 50 pm    Electronically signed by: Sonia Dye PT        Physician Signature:________________________________Date:__________________  By signing above or cosigning this note, I have reviewed this plan of care and certify a need for medically necessary rehabilitation services.

## 2020-10-12 ENCOUNTER — HOSPITAL ENCOUNTER (OUTPATIENT)
Dept: PHYSICAL THERAPY | Facility: CLINIC | Age: 50
Setting detail: THERAPIES SERIES
Discharge: HOME | End: 2020-10-12
Payer: COMMERCIAL

## 2020-10-12 PROCEDURE — 97110 THERAPEUTIC EXERCISES: CPT

## 2020-10-12 NOTE — FLOWSHEET NOTE
[] [facility]) - [facility] &  Therapy  [address]  [phone]  F: [phone] [] [address]  [place] [address]   [address]  P: [phone]  F: [phone] [] [address] &  Therapy  [address]  P: [phone]  F: [phone] [] [address]  P: [phone]  F: [phone] [] [address]  [facility]   [address]   [place]: [phone]  F: [phone]      Physical Therapy Daily Treatment Note    Date:  10/12/2020  Patient Name:  René Barlow    :  1970  MRN: [id number]  Physician: Dr. Memo Blackmon MD - Resident                          Dr. Alia Reed MD                          Insurance: [employer] [place]  Medical Diagnosis: left knee patellofemoral pain syndrome                         Rehab Codes: M25.562, M25.662, r26.2, R60.0  Onset date: 2020                        Next 's appt. : 10- Dr. Parvez Ch  Visit# / total visits: ; Cancels/No Shows: 0/0    Subjective:    Pain:  [x] Yes  [] No Location: L knee  Pain Rating: (0-10 scale) 2/10  Pain altered Tx:  [x] No  [] Yes  Action:  Comments: Pt arrives noting minimal pain at this time due to sitting all day for work. Objective:  Modalities: Heat end session 10 min, KT tape for edema control.    Precautions:  Exercises:  Exercise Reps/ Time Weight/ Level Comments   True Bike 9 min L3 scifit today                Supine         Heel slides 15x slider Not today - PTA error   Hamstring stretch 3x30\" Belt    Quad sets 20x3\"     HS set 20x3\"     Bridges 20x Llime     SLR 20x       Hip abd 20x Lime     Hip add 20x Ball          Sidelying      Hip abd 20x     clams 20x lime             Prone      Hip ext 20x     HS curl 20x     Quad stretch 3x20\" manual Standing       Heel raises 20x     3 way hip  10x ea lime Bilaterally   TKE  20x Blue    Step up 10x 4\"     Step down 10x 4\" painful   Gastroc stretch 3x30\"  slant                Gait education     Focus on heel strike, equal weight bearing   Other:unable to keep knee extended with SLR     Pt educated on wear , benefits and removal of KT tape. Functional Test: LEFI at eval Score: 75% functionally impaired        Treatment Charges: Mins Units   [x]  Modalities - HP 10 0   [x]  Ther Exercise 45 3   []  Manual Therapy     []  Ther Activities     []  Aquatics     []  Vasocompression     []  Other     Total Treatment time 45 3       Assessment: [x] Progressing toward goals. Pt with overall good tolerance to treatment. Pt noted slight increase in symptoms in knee when completing stairs and prone HS curls. Intermittent cueing to ensure proper form when completing exercises. KT tape applied in fan method to aid in edema reduction. Pt was educated on wear, removal and benefits of tape. [] No change. [] Other:  [x] Patient would continue to benefit from skilled physical therapy services in order to:  improve ROM and strength in left knee to equalize/normalize gait pattern. Increase strength in LE to resume jogging and weight training at gym for health and fitness.       STG: (to be met in 5 treatments)  1. ? Pain:50% in left knee  2. ? ROM: full extension to be observed by entering clinic with heel strike with gait. 3. ? Strength: +4/5 in left LE and demonstrate up and down 3 steps with 1 rail to simulate getting on and off [employer] bus for work  4. Increase left knee flexion actively to 120 for improved mobility with steps and home tasks. 5. Patient to be independent with home exercise program as demonstrated by performance with correct form without cues. 6. Demonstrate Knowledge of fall prevention     LTG: (to be met in 10   treatments)  Ability to do up and down a flight of steps reciprocally  1.  Ability

## 2020-10-13 ENCOUNTER — TELEPHONE (OUTPATIENT)
Dept: FAMILY MEDICINE CLINIC | Age: 50
End: 2020-10-13

## 2020-10-14 ENCOUNTER — TELEPHONE (OUTPATIENT)
Dept: FAMILY MEDICINE CLINIC | Age: 50
End: 2020-10-14

## 2020-10-14 NOTE — TELEPHONE ENCOUNTER
Patient Altagracia Putnam paperwork was completed by you. Number 10 & 11 was not completed and according to the instructions from the tarta is need to state when patient was unable to work (start date) and then the end date. Please advise. Thank you. Form in back in your mail box.

## 2020-10-15 ENCOUNTER — TELEPHONE (OUTPATIENT)
Dept: FAMILY MEDICINE CLINIC | Age: 50
End: 2020-10-15

## 2020-10-15 ENCOUNTER — HOSPITAL ENCOUNTER (OUTPATIENT)
Dept: PHYSICAL THERAPY | Facility: CLINIC | Age: 50
Setting detail: THERAPIES SERIES
Discharge: HOME | End: 2020-10-15
Payer: COMMERCIAL

## 2020-10-15 PROCEDURE — 97110 THERAPEUTIC EXERCISES: CPT

## 2020-10-15 PROCEDURE — 97140 MANUAL THERAPY 1/> REGIONS: CPT

## 2020-10-15 NOTE — TELEPHONE ENCOUNTER
Phone call placed to patient to advise her forms are complete. Patient states she is going to come in to office to  forms. Forms can not be faxed.  Scanned in media and copy is in [employer]

## 2020-10-15 NOTE — FLOWSHEET NOTE
[] [facility]) - [facility] &  Therapy  [address]  [phone]  F: [phone] [] [address]  [place]   [address]  P: [phone]  F: [phone] [] [address] &  Therapy  [address]  P: [phone]  F: [phone] [] [address]  P: [phone]  F: [phone] [] [address]  [facility]   [address]   [place]: [phone]  F: [phone]      Physical Therapy Daily Treatment Note    Date:  10/15/2020  Patient Name:  Kathy Mcgarry    :  1970  MRN: [id number]  Physician: Dr. David Willingham MD - Resident                          Dr. Michelle Russell MD                          Insurance: Medical [place]  Medical Diagnosis: left knee patellofemoral pain syndrome                         Rehab Codes: M25.562, M25.662, r26.2, R60.0  Onset date: 2020                        Next 's appt. : 10- Dr. Pablo Garcia  Visit# / total visits: 3/12; Cancels/No Shows: 0/0    Subjective:    Pain:  [x] Yes  [] No Location: L knee  Pain Rating: (0-10 scale) 2/10 - walking  Pain altered Tx:  [x] No  [] Yes  Action:  Comments: Pt arrives stating pain continues to be present when walking but feels she is getting better. Objective:  Modalities: Heat end session 10 min- not today   KT tape for edema control. Precautions:  Exercises: All exercises completed bilaterally unless otherwise noted.  Bolded completed 10/15/20  Exercise Reps/ Time Weight/ Level Comments   True Bike 8 min L3 scifit today                Supine         Patellar mobs x  Minimal Medial/ lateral    Heel slides 15x slider Not today - PTA error   Hamstring stretch 3x30\" Belt    Quad sets 20x3\"     HS set 20x3\"     Bridges 15x2 Blue Progressed 10/15   SLR 20x ea Lime Progressed 10/15   Hip abd 20x blue Progressed 10/15   Hip add 20x Ball          Sidelying      Hip abd 20x ea Lime Progressed 10/15   clams 20x ea blue Progressed 10/15            Prone      Hip ext 20x ea     Hip ext - glute bias 20x ea  Added 10/15   HS curl 20x LLE     Quad stretch 3x20\" ea manual                Standing       Heel raises 20x     3 way hip  10x ea blue Bilaterally, Progressed 10/15   TKE  20x Blue    Step up 10x 4\"     Step down 10x 4\" painful   Gastroc stretch 3x30\"  slant     Side stepping 1x30\" ea Blue Band at knees Added 10/15   Monster walk- fwd/retro 1x30\" ea Blue Band at knees Added 10/15         Gait education     Focus on heel strike, equal weight bearing   Other:unable to keep knee extended with SLR     Manual :  Hypervolt, speed 1 flat head to glutes, HS, ITB, gastrocs,and quads bilaterally however primary focus on LLE. x8 min  KT tape fan method to L knee for edema control. x2 min      Pt educated on wear , benefits and removal of KT tape. Functional Test: LEFI at [facility] Score: 75% functionally impaired        Treatment Charges: Mins Units   [x]  Modalities - HP     [x]  Ther Exercise 44 3   [x]  Manual Therapy 10 1   []  Ther Activities     []  Aquatics     []  Vasocompression     []  Other     Total Treatment time 54 4       Assessment: [x] Progressing toward goals. Pt again with overall good tolerance to treatment. Completed patellar mobs as patient has minimal mobility medially and laterally. Pt with increased anterior knee pain in TKE. Able to make progressions with increased resistance and new exercises in standing. Pt with intermittent cueing required to ensure patient is completing the exercises with proper form. Completed manual with hypervolt to address sore musculature followed up by HS stretching bilaterally. Ended with KT tape to knee for edema control. [] No change.      [] Other:  [x] Patient would continue to benefit from skilled physical therapy services in order to:  improve ROM and strength in left knee to equalize/normalize gait pattern. Increase strength in LE to resume jogging and weight training at gym for health and fitness.       STG: (to be met in 5 treatments)  1. ? Pain:50% in left knee  2. ? ROM: full extension to be observed by entering clinic with heel strike with gait. 3. ? Strength: +4/5 in left LE and demonstrate up and down 3 steps with 1 rail to simulate getting on and off [address] [employer] bus for work  4. Increase left knee flexion actively to 120 for improved mobility with steps and home tasks. 5. Patient to be independent with home exercise program as demonstrated by performance with correct form without cues. 6. Demonstrate Knowledge of fall prevention     LTG: (to be met in 10   treatments)  Ability to do up and down a flight of steps reciprocally  1. Ability to resume light weight strengthening at gym without increasing pain or edema in left knee. 2. No edema observed x 1 week in left knee. 3. Ability to rise after sitting 2 hours working without increased knee pain. 4. Demonstrate light job, 5 minutes on treadmill with symmetric gait mechanics                    Patient goals: exercise and return to running       Pt. Education:  [] Yes  [] No  [x] Reviewed Prior HEP/Ed  Method of Education: [x] Verbal  [] Demo  [x] Written  10-8-2020 HEP - quad sets, SLR, heel slides, hip abd sidelying, gastroc stretch, hamstring stretch, gait education (heel strike)  10-  - KT tape instructions  Comprehension of Education:  [x] Verbalizes understanding. [x] Demonstrates understanding. [x] Needs review. [] Demonstrates/verbalizes HEP/Ed previously given. Plan: [x] Continue current frequency toward long and short term goals. [] Specific Instructions for subsequent treatments: Progress as able.        Time In:3:00 pm           Time Out: 4:05 PM    Electronically signed by:  Alayna Whitehead PTA

## 2020-10-19 ENCOUNTER — HOSPITAL ENCOUNTER (OUTPATIENT)
Dept: PHYSICAL THERAPY | Facility: CLINIC | Age: 50
Setting detail: THERAPIES SERIES
Discharge: HOME | End: 2020-10-19
Payer: COMMERCIAL

## 2020-10-19 PROCEDURE — 97140 MANUAL THERAPY 1/> REGIONS: CPT

## 2020-10-19 PROCEDURE — 97110 THERAPEUTIC EXERCISES: CPT

## 2020-10-19 NOTE — FLOWSHEET NOTE
[] [facility]) - [facility] &  Therapy  [address]  [phone]  F: [phone] [] [address]  [place]   [address]  P: [phone]  F: [phone] [] [address] &  Therapy  [address]  P: [phone]  F: [phone] [] [address]  P: [phone]  F: [phone] [] [address]  [facility]   [address]   [place]: [phone]  F: [phone]      Physical Therapy Daily Treatment Note    Date:  10/19/2020  Patient Name:  Katarina Garduno    :  1970  MRN: [id number]  Physician: Dr. Bobby Martinez MD - Resident                          Dr. Mateus Hartman MD                          Insurance: Medical [employer]  Medical Diagnosis: left knee patellofemoral pain syndrome                         Rehab Codes: M25.562, M25.662, r26.2, R60.0  Onset date: 2020                        Next 's appt. : 10- Dr. Moreno Minium  Visit# / total visits: ; Cancels/No Shows: 0/0    Subjective:    Pain:  [x] Yes  [] No Location: L knee  Pain Rating: (0-10 scale) 1/10 - walking  Pain altered Tx:  [x] No  [] Yes  Action:  Comments: Pt arrives noting her pain is low. States she did fall today getting off the bus but did not hurt her knee. Objective:  Modalities: Heat end session 10 min- not today   KT tape for edema control. Precautions:  Exercises: All exercises completed bilaterally unless otherwise noted.  Bolded completed 10/19/20  Exercise Reps/ Time Weight/ Level Comments   True Bike 8 min L3 scifit today                Supine         Patellar mobs x  Minimal Medial/ lateral    Heel slides 15x slider    Hamstring stretch 3x30\" Belt    Quad sets 20x3\"     HS set 20x3\"     Bridges 15x2 Blue Progressed 10/15   SLR 20x ea Lime Progressed 10/15   Hip abd 20x Plum Progressed 10/15 Progressed 10/19   Hip add 20x3\" Ball          Sidelying      Hip abd 20x ea Lime Progressed 10/15   clams 20x ea blue Progressed 10/15            Prone      Hip ext 20x ea Lime Progressed 10/19   Hip ext - glute bias 20x ea  Added 10/15   HS curl 20x LLE     Quad stretch 3x20\" ea manual                Standing       Heel raises 20x     3 way hip  20x ea blue Bilaterally, Progressed 10/15   TKE  20x3\" Plum Progressed 10/19   Step up 20x 4\" Progressed reps 10/19   Step down 20x 4\" Dull pain above patella Progressed reps 10/19   Gastroc stretch 3x30\"  slant     Side stepping 2x30\" ea Blue Band at knees Added 10/15  Progressed 10/19   Monster walk- fwd/retro 2x30\" ea Blue Band at knees Added 10/15 Progressed 10/19   Squats 20x  Added 10/19   Lunges    NEXT         Gait education     Focus on heel strike, equal weight bearing   Other:unable to keep knee extended with SLR     Manual :  Hypervolt, speed 1 flat head to glutes, HS, ITB, gastrocs,and quads bilaterally however primary focus on LLE. x8 min  KT tape fan method to L knee for edema control. x2 min    Pt educated on wear , benefits and removal of KT tape. Functional Test: LEFI at eval Score: 75% functionally impaired        Treatment Charges: Mins Units   [x]  Modalities - HP     [x]  Ther Exercise 44 3   [x]  Manual Therapy 10 1   []  Ther Activities     []  Aquatics     []  Vasocompression     []  Other     Total Treatment time 54 4       Assessment: [x] Progressing toward goals. Pt continues to have overall good tolerance to treatment. Pt reports discomfort with most exercises however able to complete. Pt was able to make progressions with increased resistance and new exercises. Continued to end with hypervolt to address sore musculature followed up by HS stretching bilaterally. Ended with KT tape to knee for edema control. [] No change.      [] Other:  [x] Patient would continue to benefit from skilled physical therapy services in order to:  improve ROM and strength in left knee to equalize/normalize gait pattern. Increase strength in LE to resume jogging and weight training at gym for health and fitness.       STG: (to be met in 5 treatments)  1. ? Pain:50% in left knee  2. ? ROM: full extension to be observed by entering clinic with heel strike with gait. 3. ? Strength: +4/5 in left LE and demonstrate up and down 3 steps with 1 rail to simulate getting on and off [address] [employer] bus for work  4. Increase left knee flexion actively to 120 for improved mobility with steps and home tasks. 5. Patient to be independent with home exercise program as demonstrated by performance with correct form without cues. 6. Demonstrate Knowledge of fall prevention     LTG: (to be met in 10   treatments)  Ability to do up and down a flight of steps reciprocally  1. Ability to resume light weight strengthening at gym without increasing pain or edema in left knee. 2. No edema observed x 1 week in left knee. 3. Ability to rise after sitting 2 hours working without increased knee pain. 4. Demonstrate light job, 5 minutes on treadmill with symmetric gait mechanics     Patient goals: exercise and return to running       Pt. Education:  [x] Yes  [] No  [x] Reviewed Prior HEP/Ed  Method of Education: [x] Verbal  [] Demo  [] Written  10-8-2020 HEP - quad sets, SLR, heel slides, hip abd sidelying, gastroc stretch, hamstring stretch, gait education (heel strike)  10-  - KT tape instructions  Comprehension of Education:  [x] Verbalizes understanding. [x] Demonstrates understanding. [x] Needs review. [] Demonstrates/verbalizes HEP/Ed previously given. Plan: [x] Continue current frequency toward long and short term goals. [x] Specific Instructions for subsequent treatments: Progress as able.        Time In:256 pm           Time Out: 402 PM    Electronically signed by:  Rodolfo Fowler PTA

## 2020-10-22 ENCOUNTER — HOSPITAL ENCOUNTER (OUTPATIENT)
Dept: PHYSICAL THERAPY | Facility: CLINIC | Age: 50
Setting detail: THERAPIES SERIES
Discharge: HOME | End: 2020-10-22
Payer: COMMERCIAL

## 2020-10-22 PROCEDURE — 97110 THERAPEUTIC EXERCISES: CPT

## 2020-10-22 PROCEDURE — 97140 MANUAL THERAPY 1/> REGIONS: CPT

## 2020-10-22 NOTE — FLOWSHEET NOTE
[] [facility]) - [facility] &  Therapy  [address]  [phone]  F: [phone] [] [address]  [place]   [address]  P: [phone]  F: [phone] [] [address] &  Therapy  [address]  P: [phone]  F: [phone] [] [address]  P: [phone]  F: [phone] [] [address]  [facility]   [address]   [place]: [phone]  F: [phone]      Physical Therapy Daily Treatment Note    Date:  10/22/2020  Patient Name:  Catherine Gaston    :  1970  MRN: [id number]  Physician: Dr. Paul Crowley MD - Resident                          Dr. Ahmet Cat MD                          Insurance: Medical [place]  Medical Diagnosis: left knee patellofemoral pain syndrome                         Rehab Codes: M25.562, M25.662, r26.2, R60.0  Onset date: 2020                        Next 's appt. : 10- Dr. Troy Abraham  Visit# / total visits: ; Cancels/No Shows: 0/0    Subjective:    Pain:  [x] Yes  [] No Location: L knee  Pain Rating: (0-10 scale) 1/10 - walking  Pain altered Tx:  [x] No  [] Yes  Action:  Comments: Pt reports some medial and superior left knee pain. Objective:  Modalities: Heat end session 10 min- not today   KT tape for edema control. Precautions:  Exercises: All exercises completed bilaterally unless otherwise noted.  Bolded completed 10/22/20  Exercise Reps/ Time Weight/ Level Comments   True Bike 6 min L3 scifit today                Supine         Patellar mobs x  Minimal Medial/ lateral    Heel slides 15x slider    Hamstring stretch 3x30\" Belt    Quad sets 20x3\"     HS set 20x3\"     Bridges 15x2 Blue Progressed 10/15   SLR 20x ea Lime Progressed 10/15   Hip abd 20x Plum Progressed 10/15 Progressed 10/19   Hip add 20x3\" Ball          Sidelying      Hip abd 20x ea Lime Progressed 10/15   clams 20x ea blue Progressed 10/15            Prone      Hip ext 20x ea Lime Progressed 10/19   Hip ext - glute bias (knee bent) 20x ea  Added 10/15   HS curl 20x LLE     Quad stretch 3x20\" ea manual                Standing       Heel raises 20x     3 way hip  20x ea blue Bilaterally, Progressed 10/15   TKE  20x3\" Plum Progressed 10/19   Step up 20x 4\" Progressed reps 10/19   Step down 20x 4\" Dull pain above patella Progressed reps 10/19   Gastroc stretch 3x30\"  slant     Side stepping 4x30\" ea Blue Band at knees Added 10/15  Progressed 10/19, 10/22 increased distance   Monster walk- fwd/retro 4x30\" ea Blue Band at knees Added 10/15 Progressed 10/19, 10/22 increased distance   Squats 20x  Added 10/19   Lunges  20x  Added 10/22         Gait education     Focus on heel strike, equal weight bearing   Other:unable to keep knee extended with SLR     Manual :  Hypervolt, speed 1 flat head to glutes, HS, ITB, gastrocs,and quads  Left side  . x2 min  10/22/2020 discontinued use of tape, pt reported it was \"too tight and hurt\"      NEXT TREATMENT - consider light jog on treadmill vs. Bike, watch form   Try step ups to 15 inch step to simulate on and off bus    Functional Test: LEFI at eval Score: 75% functionally impaired        Treatment Charges: Mins Units   [x]  Modalities - HP     [x]  Ther Exercise 35 2   [x]  Manual Therapy 10 1   []  Ther Activities     []  Aquatics     []  Vasocompression     []  Other     Total Treatment time 45 3       Assessment: [x] Progressing toward goals. Patient reports improved ability to go up and down steps, walk without pain, less swelling. Attempted a small run, some soreness but just a short distance. Reports increased soreness with kinesiotape so held today. [] No change.      [] Other:  [x] Patient would continue to benefit from skilled physical therapy services in order to: improve ROM and strength in left knee to equalize/normalize gait pattern. Increase strength in LE to resume jogging and weight training at gym for health and fitness.       STG: (to be met in 5 treatments)  1. ? Pain:50% in left knee  2. ? ROM: full extension to be observed by entering clinic with heel strike with gait. Met 10-  3. ? Strength: +4/5 in left LE and demonstrate up and down 3 steps with 1 rail to simulate getting on and off [address] [employer] bus for work  4. Increase left knee flexion actively to 120 for improved mobility with steps and home tasks. met, 10- full AROM of left knee flexion to 122  5. Patient to be independent with home exercise program as demonstrated by performance with correct form without cues. 10- progressing       LTG: (to be met in 10   treatments)  Ability to do up and down a flight of steps reciprocally  1. Ability to resume light weight strengthening at gym without increasing pain or edema in left knee. 2. No edema observed x 1 week in left knee. 3. Ability to rise after sitting 2 hours working without increased knee pain. 4. Demonstrate light job, 5 minutes on treadmill with symmetric gait mechanics     Patient goals: exercise and return to running       Pt. Education:  [x] Yes  [] No  [x] Reviewed Prior HEP/Ed  Method of Education: [x] Verbal  [] Demo  [] Written  10-8-2020 HEP - quad sets, SLR, heel slides, hip abd sidelying, gastroc stretch, hamstring stretch, gait education (heel strike)  10-  - KT tape instructions  Comprehension of Education:  [x] Verbalizes understanding. [x] Demonstrates understanding. [x] Needs review. [] Demonstrates/verbalizes HEP/Ed previously given. Plan: [x] Continue current frequency toward long and short term goals. [x] Specific Instructions for subsequent treatments: Progress as able.        Time In: 3:09 am        Time Out: 4:01 pm    Electronically signed by:  Doe Redman PT

## 2020-10-26 ENCOUNTER — HOSPITAL ENCOUNTER (OUTPATIENT)
Dept: PHYSICAL THERAPY | Facility: CLINIC | Age: 50
Setting detail: THERAPIES SERIES
Discharge: HOME | End: 2020-10-26
Payer: COMMERCIAL

## 2020-10-28 ENCOUNTER — OFFICE VISIT (OUTPATIENT)
Dept: FAMILY MEDICINE CLINIC | Age: 50
End: 2020-10-28
Payer: COMMERCIAL

## 2020-10-28 VITALS
BODY MASS INDEX: 28.35 KG/M2 | DIASTOLIC BLOOD PRESSURE: 85 MMHG | SYSTOLIC BLOOD PRESSURE: 130 MMHG | TEMPERATURE: 97.1 F | HEART RATE: 85 BPM | WEIGHT: 155 LBS

## 2020-10-28 PROCEDURE — 99213 OFFICE O/P EST LOW 20 MIN: CPT | Performed by: FAMILY MEDICINE

## 2020-10-28 PROCEDURE — 3017F COLORECTAL CA SCREEN DOC REV: CPT | Performed by: FAMILY MEDICINE

## 2020-10-28 PROCEDURE — G8484 FLU IMMUNIZE NO ADMIN: HCPCS | Performed by: FAMILY MEDICINE

## 2020-10-28 PROCEDURE — G8427 DOCREV CUR MEDS BY ELIG CLIN: HCPCS | Performed by: FAMILY MEDICINE

## 2020-10-28 PROCEDURE — G8419 CALC BMI OUT NRM PARAM NOF/U: HCPCS | Performed by: FAMILY MEDICINE

## 2020-10-28 PROCEDURE — 1036F TOBACCO NON-USER: CPT | Performed by: FAMILY MEDICINE

## 2020-10-28 NOTE — PROGRESS NOTES
Visit Information    Have you changed or started any medications since your last visit including any over-the-counter medicines, vitamins, or herbal medicines? no   Have you stopped taking any of your medications? Is so, why? -  no  Are you having any side effects from any of your medications? - no    Have you seen any other physician or provider since your last visit?  no   Have you had any other diagnostic tests since your last visit?  no   Have you been seen in the emergency room and/or had an admission in a hospital since we last saw you?  no   Have you had your routine dental cleaning in the past 6 months?  yes - routine     Do you have an active MyChart account? If no, what is the barrier?   No: declined    Patient Care Team:  Marialuisa Conteh MD as PCP - General (Family Medicine)  Kelly Ramirez MD as Consulting Physician (Gastroenterology)    Medical History Review  Past Medical, Family, and Social History reviewed and does not contribute to the patient presenting condition    Health Maintenance   Topic Date Due    Shingles Vaccine (1 of 2) 03/15/2019    Flu vaccine (1) 09/01/2020    Cervical cancer screen  03/13/2024 (Originally [birth year]/1990)    Diabetes screen  08/08/2021    Breast cancer screen  09/30/2021    Lipid screen  08/08/2023    DTaP/Tdap/Td vaccine (2 - Td) 09/10/2028    Colon cancer screen colonoscopy  02/11/2030    HIV screen  Completed    Hepatitis A vaccine  Aged Out    Hepatitis B vaccine  Aged Out    Hib vaccine  Aged Out    Meningococcal (ACWY) vaccine  Aged Out    Pneumococcal 0-64 years Vaccine  Aged Out

## 2020-10-28 NOTE — PROGRESS NOTES
Sports Medicine Consultation     CHIEF COMPLAINT:  1 Month Follow-Up (1 month follow up for left knee . patient states knee is feeling better)      HPI:  Linette Baxter is a 45y.o. year old female who is a  established patient being seen for regarding follow up of a pre-existing problem left knee pain. The pain has been present for 2 month(s). The patient recalls a no new injury. The patient has tried PT and nsiads with improvement. The pain is described as resolved. There is not pain on weightbearing. The knee does not swell. There is is not painful popping and clicking. The knee does not catch or lock. It has not given out. It is not stiff upon arising from sitting. It is not painful to go up and down stairs and sit for a prolonged period of time. she has a past medical history of Acid reflux. she has a past surgical history that includes Hysterectomy; Tubal ligation; Cholecystectomy; Wrist fracture surgery (Right); Colonoscopy (N/A, 2/11/2020); and Upper gastrointestinal endoscopy (2/11/2020). family history includes Colon Cancer in her maternal grandmother.     Social History     Socioeconomic History    Marital status: Single     Spouse name: Not on file    Number of children: Not on file    Years of education: Not on file    Highest education level: Not on file   Occupational History    Not on file   Social Needs    Financial resource strain: Not on file    Food insecurity     Worry: Not on file     Inability: Not on file    Transportation needs     Medical: Not on file     Non-medical: Not on file   Tobacco Use    Smoking status: Never Smoker    Smokeless tobacco: Never Used   Substance and Sexual Activity    Alcohol use: Not Currently    Drug use: Never    Sexual activity: Not on file   Lifestyle    Physical activity     Days per week: Not on file     Minutes per session: Not on file    Stress: Not on file   Relationships    Social connections     Talks on

## 2020-11-02 ENCOUNTER — HOSPITAL ENCOUNTER (OUTPATIENT)
Dept: PHYSICAL THERAPY | Facility: CLINIC | Age: 50
Setting detail: THERAPIES SERIES
Discharge: HOME | End: 2020-11-02
Payer: COMMERCIAL

## 2020-11-02 PROCEDURE — 97110 THERAPEUTIC EXERCISES: CPT

## 2020-11-02 PROCEDURE — 97140 MANUAL THERAPY 1/> REGIONS: CPT

## 2020-11-02 NOTE — FLOWSHEET NOTE
[] [facility]) - [facility] &  Therapy  [address]  [phone]  F: [phone] [x] [address]  [place] [address]   [address]  P: [phone]  F: [phone] [] [address] &  Therapy  [address]  P: [phone]  F: [phone] [] [address]  P: [phone]  F: [phone] [] [address]  [facility]   [address]   [place]: [phone]  F: [phone]      Physical Therapy Daily Treatment Note    Date:  2020  Patient Name:  Wayne Cochran    :  1970  MRN: [id number]  Physician: Dr. Lidia García MD - Resident                          Dr. 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 Pain altered Tx:  [x] No  [] Yes  Action:  Comments: Pt states she tried to jog on treadmill, 5 min, walked, then 5 minutes more, then eliptical.  Used some Icy HOt. Today knee is \"pretty good\"    Objective:  Modalities: Heat end session 10 min- not today   KT tape for edema control. Precautions:  Exercises: All exercises completed bilaterally unless otherwise noted.  Bolded completed 20  Exercise Reps/ Time Weight/ Level Comments   True Bike 7 min L3 scifit today                Supine         Patellar mobs x  Minimal Medial/ lateral    Heel slides 15x slider    Hamstring stretch 3x30\" [employer] progressing well. Continued limited patellar mobility. Advised to try to extend left knee during the day due to working sitting most of her shift and tight hamstrings on left side    [] No change. [] Other:  [x] Patient would continue to benefit from skilled physical therapy services in order to:  improve ROM and strength in left knee to equalize/normalize gait pattern. Increase strength in LE to resume jogging and weight training at gym for health and fitness.       STG: (to be met in 5 treatments)  1. ? Pain:50% in left knee, met 11-1-2020  2. ? ROM: full extension to be observed by entering clinic with heel strike with gait. Met 10-  3. ? Strength: +4/5 in left LE and demonstrate up and down 3 steps with 1 rail to simulate getting on and off [address] [place] [employer] bus for work  4. Increase left knee flexion actively to 120 for improved mobility with steps and home tasks. met, 10- full AROM of left knee flexion to 122  5. Patient to be independent with home exercise program as demonstrated by performance with correct form without cues. 10- progressing       LTG: (to be met in 10   treatments)  Ability to do up and down a flight of steps reciprocally  1. Ability to resume light weight strengthening at gym without increasing pain or edema in left knee. 2. No edema observed x 1 week in left knee. 3. Ability to rise after sitting 2 hours working without increased knee pain. 4. Demonstrate light job, 5 minutes on treadmill with symmetric gait mechanics     Patient goals: exercise and return to running       Pt. Education:  [x] Yes  [] No  [x] Reviewed Prior HEP/Ed  Method of Education: [x] Verbal  [] Demo  [] Written  10-8-2020 HEP - quad sets, SLR, heel slides, hip abd sidelying, gastroc stretch, hamstring stretch, gait education (heel strike)  10-  - KT tape instructions  Comprehension of Education:  [x] Verbalizes understanding. [x] Demonstrates understanding. [x] Needs review.   [] Demonstrates/verbalizes HEP/Ed previously given. Plan: [x] Continue current frequency toward long and short term goals. [x] Specific Instructions for subsequent treatments: Progress as able.        Time In:2: 57 pm      Time Out: 3: 51 am    Electronically signed by:  Yvrose Strong PT

## 2020-11-04 ENCOUNTER — HOSPITAL ENCOUNTER (OUTPATIENT)
Dept: PHYSICAL THERAPY | Facility: CLINIC | Age: 50
Setting detail: THERAPIES SERIES
Discharge: HOME | End: 2020-11-04
Payer: COMMERCIAL

## 2020-11-04 PROCEDURE — 97110 THERAPEUTIC EXERCISES: CPT

## 2020-11-04 PROCEDURE — 97140 MANUAL THERAPY 1/> REGIONS: CPT

## 2020-11-04 PROCEDURE — 97750 PHYSICAL PERFORMANCE TEST: CPT

## 2020-11-04 NOTE — FLOWSHEET NOTE
[] [facility]) - [facility] &  Therapy  [address]  [phone]  F: [phone] [x] [address]  [place] [address]   [address]  P: [phone]  F: [phone] [] [address]  [address]  P: [phone]  F: [phone] [] [address]  P: [phone]  F: [phone] [] [address]  [facility]   [address]   [place]: [phone]  F: [phone]      Physical Therapy Daily Treatment Note    Date:  2020  Patient Name:  Linette Baxter    :  1970  MRN: [id number]  Physician: Dr. Rhys Tabares MD - Resident                          Dr. 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 Pain altered Tx:  [x] No  [] Yes  Action:  Comments: Pt reports that she is feeling better, reporting medial posterior knee pain after sitting for prolonged periods of time. Pt reports that she ran for 5 minutes on Saturday and had increased pain to 4/10. Objective:  Modalities: Heat end session 10 min- not today   KT tape for edema control. Precautions:  Exercises: All exercises completed bilaterally unless otherwise noted.  Bolded completed 20  Exercise Reps/ Time Weight/ Level Comments   True Bike 5 min L3                Supine         Patellar mobs x  Minimal Medial/ lateral    Heel slides 15x slider Hamstring stretch 3x30\" Belt    Quad sets 20x3\"     HS set 20x3\"     Bridges 20x2 Blue Progressed 10/15, increased reps 11/4   SLR 20x ea Blue Progressed 10/15   Hip abd 20x Plum Progressed 10/15 Progressed 10/19   Hip add 20x3\" Ball          Sidelying      Hip abd 20x2 ea Lime Progressed 10/15, increased reps 11/4   clams 20x2 ea blue Progressed 10/15, increased reps 11/4            Prone      Hip ext 20x ea Lime Progressed 10/19   Hip ext - glute bias (knee bent) 20x ea  Added 10/15, increased reps 11/4   HS curl 20x LLE     Quad stretch 3x20\" ea manual                Standing       Heel raises 20x     3 way hip  20x ea blue Bilaterally, Progressed 10/15   TKE  20x3\" Plum Progressed 10/19   Step up 20x 4\" Progressed reps 10/19 Try 15 inch next visit   Step down 20x 4\" Dull pain above patella Progressed reps 10/19   Gastroc stretch 3x30\"  slant     Side stepping 4x30\" ea Blue Band at ankles Added 10/15  Progressed 10/19, 10/22 increased distance   Monster walk- fwd/retro 4x30\" ea Blue Band at ankles Added 10/15 Progressed 10/19, 10/22 increased distance   Heel taps 10x2 2\" Added 11/4   Squats 20x  Added 10/19   Lunges  20x  Added 10/22   Kneeling hip flexor stretch 2'  Added 11/4   Gait education     Focus on heel strike, equal weight bearing   Other:unable to keep knee extended with SLR     Manual :  Hypervolt, speed 1 flat head to glutes, HS, ITB, gastrocs,and quads; Direct inhibition (DI) to proximal and distal hip flexor  Left side  .  x2 min  10/22/2020 discontinued use of tape, pt reported it was \"too tight and hurt\"      11/4/2020:   - Increased muscle spasm and tightness to distal semimembranosus, 3+/5 HS strength at 90 degrees of knee flexion  - Limited L hip extension, hip extension strength = L 4/5, R 4+/5  - Hip abduction strength = L 3+/5, R 4+/5  Video Run Analysis 11/4/2020   Pace: 10:00  min/mile   Duration: 5 minutes    Shoes: Sai   Arpita: 180 spm    Frontal plane deviations:    Dynamic genu valgus    Contralateral pelvic drop    Increased cross body arm swing       Sagittal plane deviations: t    Limited hip ext from midstance to toe off      Functional Test: LEFI at eval Score: 75% functionally impaired        Treatment Charges: Mins Units   [x]  Modalities - HP     [x]  Ther Exercise 34 2   [x]  Manual Therapy 10 1   []  Ther Activities     []  Aquatics     []  Vasocompression     [x]  Other: phys perf test 10 1   Total Treatment time 54 4       Assessment: [x] Progressing toward goals. Pt continues to demonstrate significant L distal semimembranosus muscle tightness and spasm. Additionally, pt with increased hip flexor tightness and glute inhibition. Progressed exercises as noted above and cued throughout to decrease compensatory muscle patterns. Specifically, pt cued to decrease anterior leg positioning with sidelying hip abduction. Performed video run analysis at this date, minimal deficits noted in patients form, most limited due to limited L hip extension. Will continue to perform manual therapy and stretches in order to normalize range of motion. Pt reporting no increased L knee pain while running at today's date. Pt challenged with additional of heel taps from 2\" step due fatigue and limited eccentric control, cues provided to improve control. [] No change. [] Other:  [x] Patient would continue to benefit from skilled physical therapy services in order to:  improve ROM and strength in left knee to equalize/normalize gait pattern. Increase strength in LE to resume jogging and weight training at gym for health and fitness.       STG: (to be met in 5 treatments)  1. ? Pain:50% in left knee, met 11-1-2020  2. ? ROM: full extension to be observed by entering clinic with heel strike with gait. Met 10-  3. ? Strength: +4/5 in left LE and demonstrate up and down 3 steps with 1 rail to simulate getting on and off [address] [employer] bus for work  4.  Increase left knee flexion actively to 120 for

## 2020-11-09 ENCOUNTER — HOSPITAL ENCOUNTER (OUTPATIENT)
Dept: PHYSICAL THERAPY | Facility: CLINIC | Age: 50
Setting detail: THERAPIES SERIES
Discharge: HOME | End: 2020-11-09
Payer: COMMERCIAL

## 2020-11-09 PROCEDURE — 97140 MANUAL THERAPY 1/> REGIONS: CPT

## 2020-11-09 PROCEDURE — 97110 THERAPEUTIC EXERCISES: CPT

## 2020-11-09 NOTE — FLOWSHEET NOTE
Bridges 20x2 Blue Progressed 10/15, increased reps 11/4   SLR 20x ea Blue Progressed 10/15   Hip abd 20x Plum Progressed 10/15 Progressed 10/19   Hip add 20x3\" Ball          Sidelying      Hip abd 20x2 ea Lime Progressed 10/15, increased reps 11/4   clams 20x2 ea blue Progressed 10/15, increased reps 11/4            Prone      Hip ext 20x ea Lime Progressed 10/19   Hip ext - glute bias (knee bent) 20x ea  Added 10/15, increased reps 11/4   HS curl 20x LLE     Quad stretch 3x20\" ea manual                Standing       Heel raises 20x     3 way hip  20x ea blue Bilaterally, Progressed 10/15   TKE  20x2 x3\" Arvizu Progressed 10/19 Progressed 11/9   Step up 20x 13.5\" Progressed reps 10/19  Progressed 11/9   Step down 20x 4\" Dull pain above patella Progressed reps 10/19   Gastroc stretch 3x30\"  slant     Side stepping 4x30\" ea Blue Band at ankles Added 10/15  Progressed 10/19, 10/22 increased distance   Monster walk- fwd/retro 4x30\" ea Blue Band at ankles Added 10/15 Progressed 10/19, 10/22 increased distance   Heel taps 10x2 2\" Added 11/4   Squats 20x  Added 10/19   Lunges  20x  Added 10/22   Kneeling hip flexor stretch 2'  Added 11/4   Gait education     Focus on heel strike, equal weight bearing   Other:unable to keep knee extended with SLR     Manual :  Hypervolt, speed 1 flat head to glutes, HS, ITB, gastrocs,and quads; Direct inhibition (DI) to proximal and distal hip flexor  Left side  .  x2 min  10/22/2020 discontinued use of tape, pt reported it was \"too tight and hurt\"      11/4/2020:   - Increased muscle spasm and tightness to distal semimembranosus, 3+/5 HS strength at 90 degrees of knee flexion  - Limited L hip extension, hip extension strength = L 4/5, R 4+/5  - Hip abduction strength = L 3+/5, R 4+/5  Video Run Analysis 11/4/2020   Pace: 10:00  min/mile   Duration: 5 minutes    Shoes: Sai   Arpita: 180 spm    Frontal plane deviations:    Dynamic genu valgus    Contralateral pelvic drop    Increased cross body arm swing       Sagittal plane deviations: t    Limited hip ext from midstance to toe off      Functional Test: LEFI at eval Score: 75% functionally impaired        Treatment Charges: Mins Units   [x]  Modalities - HP     [x]  Ther Exercise 43 3   [x]  Manual Therapy 10 1   []  Ther Activities     []  Aquatics     []  Vasocompression     []  Other: phys perf test     Total Treatment time 53 4       Assessment: [x] Progressing toward goals. Pt continues to have tightness noted in hip flexors that is addressed with manual therapy. Pt required frequent cueing to ensure a decrease in muscle voluntary muscle tightness. Attempted to progress height for heel taps however noted knee pain with completion. Pt required cueing to ensure increased hip hinge and decrease anterior tibial translation with heel taps. Cueing through out session to promote equal weight bearing into LE. Reminded patient the importance of HEP completion. [] No change. [] Other:  [x] Patient would continue to benefit from skilled physical therapy services in order to:  improve ROM and strength in left knee to equalize/normalize gait pattern. Increase strength in LE to resume jogging and weight training at gym for health and fitness.       STG: (to be met in 5 treatments)  1. ? Pain:50% in left knee, met 11-1-2020  2. ? ROM: full extension to be observed by entering clinic with heel strike with gait. Met 10-  3. ? Strength: +4/5 in left LE and demonstrate up and down 3 steps with 1 rail to simulate getting on and off [address] [employer] [place] [employer] bus for work  4. Increase left knee flexion actively to 120 for improved mobility with steps and home tasks. met, 10- full AROM of left knee flexion to 122  5. Patient to be independent with home exercise program as demonstrated by performance with correct form without cues. 10- progressing       LTG: (to be met in 10   treatments)  Ability to do up and down a flight of steps reciprocally  1.  Ability

## 2020-11-11 ENCOUNTER — HOSPITAL ENCOUNTER (OUTPATIENT)
Dept: PHYSICAL THERAPY | Facility: CLINIC | Age: 50
Setting detail: THERAPIES SERIES
Discharge: HOME | End: 2020-11-11
Payer: COMMERCIAL

## 2020-11-11 PROCEDURE — 97016 VASOPNEUMATIC DEVICE THERAPY: CPT

## 2020-11-11 PROCEDURE — 97110 THERAPEUTIC EXERCISES: CPT

## 2020-11-11 NOTE — FLOWSHEET NOTE
[] [facility]) - [facility] &  Therapy  [address]  [phone]  F: [phone] [x] [address]  [place] [address]   [address]  P: [phone]  F: [phone] [] [place]  [address]  P: [phone]  F: [phone] [] [address]  P: [phone]  F: [phone] [] [address]  [facility]   [address]   [place]: [phone]  F: [phone]      Physical Therapy Daily Treatment Note    Date:  2020  Patient Name:  Hansa Owens    :  1970  MRN: [id number]  Physician: Dr. Thi Saldana MD - Resident                          Dr. Andrew Oneill MD                          Insurance: Medical [place]  Medical Diagnosis: left knee patellofemoral pain syndrome                         Rehab Codes: M25.562, M25.662, r26.2, R60.0  Onset date: 2020                        Next 's appt.: if needed  Visit# / total visits: 9/10;      Cancels/No Shows: 1/0    Subjective:    Pain:  [x] Yes  [] No Location: L knee  Pain Rating: (0-10 scale) 1/10   Pain altered Tx:  [x] No  [] Yes  Action:  Comments: Pt reports that she feels better this week. She notes that Thursday she walked for 3 miles and had increased pain and swelling on Friday. Pt reports that she feels if she just works through the initial pain she feels fine. Objective:  Modalities: Heat end session 10 min- not today   KT tape for edema control. - not today  Precautions:  Exercises: All exercises completed bilaterally unless otherwise noted.  Bolded completed 20  Exercise Reps/ Time Weight/ Level Comments   True Bike 8 min L3                Supine         Patellar mobs x  Minimal Medial/ lateral    Heel slides 15x slider    Hamstring stretch 3x30\" Belt Quad sets 20x3\"     HS set 20x3\"     Bridges 20x2 Blue Progressed 10/15, increased reps 11/4   SLR 20x ea Blue Progressed 10/15   Hip abd 20x Plum Progressed 10/15 Progressed 10/19   Hip add 20x3\" Ball          Sidelying      Hip abd 20x2 ea Lime Progressed 10/15, increased reps 11/4   clams 20x2 ea blue Progressed 10/15, increased reps 11/4            Prone      Hip ext 20x ea Lime Progressed 10/19   Hip ext - glute bias (knee bent) 20x ea  Added 10/15, increased reps 11/4   HS curl 20x LLE     Quad stretch 3x30\" ea manual                Standing       Heel raises 20x     3 way hip  20x ea blue Bilaterally, Progressed 10/15   TKE  20x2 x3\" Arvizu Progressed 10/19 Progressed 11/9   Step up 20x 13.5\" Progressed reps 10/19  Progressed 11/9   Step down 20x 4\" Dull pain above patella Progressed reps 10/19   Gastroc stretch 3x30\"  slant     Side stepping 4x30\" ea Blue Band at ankles Added 10/15  Progressed 10/19, 10/22 increased distance   Monster walk- fwd/retro 4x30\" ea Blue Band at ankles Added 10/15 Progressed 10/19, 10/22 increased distance   Heel taps 10x2 2\" Added 11/4   Squats 20x  Added 10/19   Lunges  20x  Added 10/22   Kneeling hip flexor stretch 2'  Added 11/4   Gait education     Focus on heel strike, equal weight bearing   Other:unable to keep knee extended with SLR     Manual :  Hypervolt, speed 1 flat head to glutes, HS, ITB, gastrocs,and quads; Direct inhibition (DI) to proximal and distal hip flexor  Left side  .  x2 min  10/22/2020 discontinued use of tape, pt reported it was \"too tight and hurt\"      11/4/2020:   - Increased muscle spasm and tightness to distal semimembranosus, 3+/5 HS strength at 90 degrees of knee flexion  - Limited L hip extension, hip extension strength = L 4/5, R 4+/5  - Hip abduction strength = L 3+/5, R 4+/5  Video Run Analysis 11/4/2020   Pace: 10:00  min/mile   Duration: 5 minutes    Shoes: Sai   Arpita: 180 spm    Frontal plane deviations:    Dynamic genu valgus    Contralateral pelvic drop    Increased cross body arm swing       Sagittal plane deviations: t    Limited hip ext from midstance to toe off      Functional Test: LEFI at eval Score: 75% functionally impaired        Treatment Charges: Mins Units   [x]  Modalities - HP     [x]  Ther Exercise 43 3   [x]  Manual Therapy 12 1   []  Ther Activities     []  Aquatics     []  Vasocompression     []  Other: phys perf test     Total Treatment time 55 4       Assessment: [x] Progressing toward goals. Small improvements noted with hip extension ROM at this date as well as improved tolerance to manual pressure. Pt continues to demonstrate significant quad tightness likely due to quad dominance with exercises and while running/walking as well as prolonged sitting for work. Intermittent cues required throughout for proper demonstration of exercises and to slow down exercises in order to decrease compensatory patterns. Continued to educate patient on completing HEP and stretches especially after prolonged periods of sitting. [] No change. [] Other:  [x] Patient would continue to benefit from skilled physical therapy services in order to:  improve ROM and strength in left knee to equalize/normalize gait pattern. Increase strength in LE to resume jogging and weight training at gym for health and fitness.       STG: (to be met in 5 treatments)  1. ? Pain:50% in left knee, met 11-1-2020  2. ? ROM: full extension to be observed by entering clinic with heel strike with gait. Met 10-  3. ? Strength: +4/5 in left LE and demonstrate up and down 3 steps with 1 rail to simulate getting on and off [employer] bus for work  4. Increase left knee flexion actively to 120 for improved mobility with steps and home tasks. met, 10- full AROM of left knee flexion to 122  5. Patient to be independent with home exercise program as demonstrated by performance with correct form without cues.  10- progressing       LTG: (to be met in 10   treatments)  Ability to do up and down a flight of steps reciprocally  1. Ability to resume light weight strengthening at gym without increasing pain or edema in left knee. 2. No edema observed x 1 week in left knee. - Not met 11/4/2020  3. Ability to rise after sitting 2 hours working without increased knee pain. 4. Demonstrate light job, 5 minutes on treadmill with symmetric gait mechanics - MET 11/4/2020     Patient goals: exercise and return to running       Pt. Education:  [x] Yes  [] No  [x] Reviewed Prior HEP/Ed  Method of Education: [x] Verbal  [] Demo  [] Written  10-8-2020 HEP - quad sets, SLR, heel slides, hip abd sidelying, gastroc stretch, hamstring stretch, gait education (heel strike)  10-  - KT tape instructions  11-4-2020 HEP - bridges, sidelying hip abd, clamshells, prone hip ext  Comprehension of Education:  [x] Verbalizes understanding. [x] Demonstrates understanding. [x] Needs review. [] Demonstrates/verbalizes HEP/Ed previously given. Plan: [x] Continue current frequency toward long and short term goals. [x] Specific Instructions for subsequent treatments: Progress as able.        Time In: 2:58 pm      Time Out: 4:02 pm    Electronically signed by:  Gloria Owens PT

## 2020-12-17 NOTE — DISCHARGE SUMMARY
4+/5    Assessment:  STG: (to be met in 5 treatments)  1. ? Pain:50% in left knee, met 11-1-2020  2. ? ROM: full extension to be observed by entering clinic with heel strike with gait. Met 10-  3. ? Strength: +4/5 in left LE and demonstrate up and down 3 steps with 1 rail to simulate getting on and off [address] [place] [employer] bus for work  4. Increase left knee flexion actively to 120 for improved mobility with steps and home tasks. met, 10- full AROM of left knee flexion to 122  5. Patient to be independent with home exercise program as demonstrated by performance with correct form without cues. 10- progressing        LTG: (to be met in 10   treatments)  Ability to do up and down a flight of steps reciprocally  1. Ability to resume light weight strengthening at gym without increasing pain or edema in left knee. 2. No edema observed x 1 week in left knee. - Not met 11/4/2020  3. Ability to rise after sitting 2 hours working without increased knee pain. 4. Demonstrate light job, 5 minutes on treadmill with symmetric gait mechanics - MET 11/4/2020     Patient goals: exercise and return to running    Treatment to Date:  [x] Therapeutic Exercise    [] Modalities:  [] Therapeutic Activity     [] Ultrasound  [] Electrical Stimulation  [x] Gait Training     [] Massage       [] Lumbar/Cervical Traction  [] Neuromuscular Re-education [] Cold/hotpack [] Iontophoresis: 4 mg/mL  [] Instruction in Home Exercise Program                     Dexamethasone Sodium  [] Manual Therapy             Phosphate 40-80 mAmin  [] Aquatic Therapy                   [] Vasocompression/    [] Other:             Game Ready    Discharge Status:         [x] Pt to continue exercise/home instructions independently. [x] Therapy interrupted due to: Patient did not show last visit and has not called to schedule further visits.            Electronically signed by Doe Redman PT on 12/17/2020 at 3:44 PM      If you have any questions or concerns, please don't hesitate to call.   Thank you for your referral.

## 2021-01-21 ENCOUNTER — NURSE TRIAGE (OUTPATIENT)
Dept: OTHER | Facility: CLINIC | Age: 51
End: 2021-01-21

## 2021-01-21 ENCOUNTER — OFFICE VISIT (OUTPATIENT)
Dept: FAMILY MEDICINE CLINIC | Age: 51
End: 2021-01-21
Payer: COMMERCIAL

## 2021-01-21 VITALS
BODY MASS INDEX: 29.55 KG/M2 | HEART RATE: 95 BPM | TEMPERATURE: 97 F | HEIGHT: 62 IN | DIASTOLIC BLOOD PRESSURE: 87 MMHG | SYSTOLIC BLOOD PRESSURE: 132 MMHG | WEIGHT: 160.6 LBS

## 2021-01-21 DIAGNOSIS — M22.2X2 PATELLOFEMORAL PAIN SYNDROME OF LEFT KNEE: Primary | ICD-10-CM

## 2021-01-21 PROCEDURE — G8484 FLU IMMUNIZE NO ADMIN: HCPCS | Performed by: STUDENT IN AN ORGANIZED HEALTH CARE EDUCATION/TRAINING PROGRAM

## 2021-01-21 PROCEDURE — 3017F COLORECTAL CA SCREEN DOC REV: CPT | Performed by: STUDENT IN AN ORGANIZED HEALTH CARE EDUCATION/TRAINING PROGRAM

## 2021-01-21 PROCEDURE — 1036F TOBACCO NON-USER: CPT | Performed by: STUDENT IN AN ORGANIZED HEALTH CARE EDUCATION/TRAINING PROGRAM

## 2021-01-21 PROCEDURE — 99213 OFFICE O/P EST LOW 20 MIN: CPT | Performed by: STUDENT IN AN ORGANIZED HEALTH CARE EDUCATION/TRAINING PROGRAM

## 2021-01-21 PROCEDURE — G8427 DOCREV CUR MEDS BY ELIG CLIN: HCPCS | Performed by: STUDENT IN AN ORGANIZED HEALTH CARE EDUCATION/TRAINING PROGRAM

## 2021-01-21 PROCEDURE — G8419 CALC BMI OUT NRM PARAM NOF/U: HCPCS | Performed by: STUDENT IN AN ORGANIZED HEALTH CARE EDUCATION/TRAINING PROGRAM

## 2021-01-21 RX ORDER — NAPROXEN 500 MG/1
500 TABLET ORAL 2 TIMES DAILY WITH MEALS
Qty: 60 TABLET | Refills: 1 | Status: SHIPPED | OUTPATIENT
Start: 2021-01-21

## 2021-01-21 ASSESSMENT — ENCOUNTER SYMPTOMS
CHEST TIGHTNESS: 0
CONSTIPATION: 0
NAUSEA: 0
ABDOMINAL PAIN: 0
VOMITING: 0
SHORTNESS OF BREATH: 0
SORE THROAT: 0
COUGH: 0
DIARRHEA: 0

## 2021-01-21 ASSESSMENT — PATIENT HEALTH QUESTIONNAIRE - PHQ9
SUM OF ALL RESPONSES TO PHQ QUESTIONS 1-9: 0
SUM OF ALL RESPONSES TO PHQ QUESTIONS 1-9: 0
2. FEELING DOWN, DEPRESSED OR HOPELESS: 0

## 2021-01-21 NOTE — PROGRESS NOTES
Visit Information    Have you changed or started any medications since your last visit including any over-the-counter medicines, vitamins, or herbal medicines? no   Have you stopped taking any of your medications? Is so, why? -  no  Are you having any side effects from any of your medications? - no    Have you seen any other physician or provider since your last visit?  no   Have you had any other diagnostic tests since your last visit?  no   Have you been seen in the emergency room and/or had an admission in a hospital since we last saw you?  no   Have you had your routine dental cleaning in the past 6 months?  no     Do you have an active MyChart account? If no, what is the barrier?   Yes    Patient Care Team:  Daniel Rogel MD as PCP - General (Family Medicine)  Tommie Bernal MD as Consulting Physician (Gastroenterology)    Medical History Review  Past Medical, Family, and Social History reviewed and does contribute to the patient presenting condition    Health Maintenance   Topic Date Due    Hepatitis C screen  1970    Shingles Vaccine (1 of 2) 03/15/2019    Flu vaccine (1) 09/01/2020    Cervical cancer screen  03/13/2024 (Originally 3/15/1990)    Diabetes screen  08/08/2021    Breast cancer screen  09/30/2021    Lipid screen  08/08/2023    DTaP/Tdap/Td vaccine (2 - Td) 09/10/2028    Colon cancer screen colonoscopy  02/11/2030    HIV screen  Completed    Hepatitis A vaccine  Aged Out    Hepatitis B vaccine  Aged Out    Hib vaccine  Aged Out    Meningococcal (ACWY) vaccine  Aged Out    Pneumococcal 0-64 years Vaccine  Aged Out

## 2021-01-21 NOTE — PROGRESS NOTES
Subjective:    Ella Yepez is a 45 y.o. female with  has a past medical history of Acid reflux. Family History   Problem Relation Age of Onset    Colon Cancer Maternal Grandmother        Presented tothe office today for:  Chief Complaint   Patient presents with    Leg Pain     here for leg pain and swelling       HPI  45 y.o. F presenting for L knee pain  Pt has been seen for presenting complaint in the past  Patient was given PT and NSAIDs with improvement  Pain had resolved for some time; however for the past several days patient has been experiencing swelling and pain  Denies any popping sensation  Patient is able to ambulate; denies knee giving out  Patient states pain is worse when standing from sitting position    Review of Systems   Constitutional: Negative for chills, fatigue, fever and unexpected weight change. HENT: Negative for congestion, mouth sores and sore throat. Eyes: Negative for visual disturbance. Respiratory: Negative for cough, chest tightness and shortness of breath. Cardiovascular: Negative for chest pain and leg swelling. Gastrointestinal: Negative for abdominal pain, constipation, diarrhea, nausea and vomiting. Genitourinary: Negative for difficulty urinating. Musculoskeletal: Negative for joint swelling. Left knee pain   Skin: Negative for rash. Neurological: Negative for dizziness, weakness and headaches. Objective:    /87 (Site: Right Upper Arm, Position: Sitting, Cuff Size: Large Adult)   Pulse 95   Temp 97 °F (36.1 °C) (Temporal)   Ht 5' 2\" (1.575 m)   Wt 160 lb 9.6 oz (72.8 kg)   BMI 29.37 kg/m²    BP Readings from Last 3 Encounters:   01/21/21 132/87   10/28/20 130/85   09/30/20 131/83     Physical Exam  Constitutional:       Appearance: She is obese. Cardiovascular:      Rate and Rhythm: Normal rate and regular rhythm. Pulses: Normal pulses. Heart sounds: Normal heart sounds.    Pulmonary: Effort: Pulmonary effort is normal.      Breath sounds: Normal breath sounds. Musculoskeletal:      Left knee: She exhibits normal range of motion, no swelling, no erythema, normal alignment, no LCL laxity, normal patellar mobility and no MCL laxity. Tenderness found. No patellar tendon tenderness noted. Neurological:      Mental Status: She is alert. Lab Results   Component Value Date    WBC 5.0 11/06/2019    HGB 13.5 11/06/2019    HCT 41.7 11/06/2019     11/06/2019    CHOL 259 (H) 08/08/2018    TRIG 94 08/08/2018    HDL 98 08/08/2018    ALT 21 11/06/2019    AST 25 11/06/2019     10/16/2019    K 3.8 10/16/2019     10/16/2019    CREATININE 0.85 10/16/2019    BUN 12 10/16/2019    CO2 22 10/16/2019    TSH 0.73 09/10/2018    LABA1C 5.1 08/08/2018     Lab Results   Component Value Date    CALCIUM 9.3 10/16/2019     Lab Results   Component Value Date    LDLCHOLESTEROL 142 (H) 08/08/2018       Assessment and Plan:    1. Patellofemoral pain syndrome of left knee  Follow up with sports medicine clinic next week to discuss possible injectable agents  Continue PT and home exercises, NSAIDs, conservative management  - XR KNEE LEFT (3 VIEWS); Future  - naproxen (NAPROSYN) 500 MG tablet; Take 1 tablet by mouth 2 times daily (with meals)  Dispense: 60 tablet; Refill: 1  - diclofenac sodium (VOLTAREN) 1 % GEL; Apply 2 g topically 2 times daily  Dispense: 100 g; Refill: 1          Requested Prescriptions     Signed Prescriptions Disp Refills    naproxen (NAPROSYN) 500 MG tablet 60 tablet 1     Sig: Take 1 tablet by mouth 2 times daily (with meals)    diclofenac sodium (VOLTAREN) 1 %  g 1     Sig: Apply 2 g topically 2 times daily       Medications Discontinued During This Encounter   Medication Reason    diclofenac sodium (VOLTAREN) 1 % GEL LIST CLEANUP    naproxen (NAPROSYN) 500 MG tablet REORDER       Return in about 6 days (around 1/27/2021) for Sports medicine; Knee pain.

## 2021-01-21 NOTE — TELEPHONE ENCOUNTER
infected? \"      Denies    5. PAIN: \"Is the swelling painful to touch? \" If so, ask: \"How painful is it? \"   (Scale 1-10; mild, moderate or severe)      10/10    6. FEVER: \"Do you have a fever? \" If so, ask: \"What is it, how was it measured, and when did it start? \"       Denies    7. CAUSE: \"What do you think is causing the leg swelling? \"      Not sure    8. MEDICAL HISTORY: \"Do you have a history of heart failure, kidney disease, liver failure, or cancer? \"      Denies    9. RECURRENT SYMPTOM: \"Have you had leg swelling before? \" If so, ask: \"When was the last time? \" \"What happened that time? \"      On and off    10. OTHER SYMPTOMS: \"Do you have any other symptoms? \" (e.g., chest pain, difficulty breathing)        Denies    11. PREGNANCY: \"Is there any chance you are pregnant? \" \"When was your last menstrual period? \"        Na    Protocols used: LEG SWELLING AND EDEMA-ADULT-OH, LEG PAIN-ADULT-OH    Patient called pre-service center [facility]) to schedule appointment, with red flag complaint, transferred to RN access for triage. See above questions and answers. Caller talking full sentences without any distress on phone. Discussed disposition and patient agreeable. Discussed potential consequences for not following disposition recommendation. Aware to call back with any concerns or persistent, worsening, or new symptoms develop. Warm transfer to [facility] scheduling for appointment. Attention Provider: Thank you for allowing me to participate in the care of your patient. The  patient was connected to triage in response to information provided to the [facility]. Please do not respond through this encounter as the response is not directed to a shared pool.

## 2021-01-22 NOTE — PROGRESS NOTES
Attending Physician Statement    Wt Readings from Last 3 Encounters:   01/21/21 160 lb 9.6 oz (72.8 kg)   10/28/20 155 lb (70.3 kg)   09/30/20 157 lb (71.2 kg)     Temp Readings from Last 3 Encounters:   01/21/21 97 °F (36.1 °C) (Temporal)   10/28/20 97.1 °F (36.2 °C)   09/30/20 98.1 °F (36.7 °C)     BP Readings from Last 3 Encounters:   01/21/21 132/87   10/28/20 130/85   09/30/20 131/83     Pulse Readings from Last 3 Encounters:   01/21/21 95   10/28/20 85   09/30/20 94         I have discussed the care of Fabiana Lines, including pertinent history and exam findings with the resident. I have reviewed the key elements of all parts of the encounter with the resident. I agree with the assessment, plan and orders as documented by the resident.   (GE Modifier)

## 2021-01-27 ENCOUNTER — HOSPITAL ENCOUNTER (OUTPATIENT)
Age: 51
Discharge: HOME | End: 2021-01-29
Payer: COMMERCIAL

## 2021-01-27 ENCOUNTER — HOSPITAL ENCOUNTER (OUTPATIENT)
Dept: GENERAL RADIOLOGY | Age: 51
Discharge: HOME | End: 2021-01-29
Payer: COMMERCIAL

## 2021-01-27 DIAGNOSIS — M22.2X2 PATELLOFEMORAL PAIN SYNDROME OF LEFT KNEE: ICD-10-CM

## 2021-01-27 PROCEDURE — 73562 X-RAY EXAM OF KNEE 3: CPT

## 2021-02-10 ENCOUNTER — INITIAL CONSULT (OUTPATIENT)
Dept: FAMILY MEDICINE CLINIC | Age: 51
End: 2021-02-10
Payer: COMMERCIAL

## 2021-02-10 VITALS
HEART RATE: 82 BPM | SYSTOLIC BLOOD PRESSURE: 136 MMHG | HEIGHT: 62 IN | WEIGHT: 159.6 LBS | DIASTOLIC BLOOD PRESSURE: 89 MMHG | BODY MASS INDEX: 29.37 KG/M2

## 2021-02-10 DIAGNOSIS — M22.2X2 PATELLOFEMORAL PAIN SYNDROME OF LEFT KNEE: ICD-10-CM

## 2021-02-10 DIAGNOSIS — G89.29 CHRONIC PAIN OF LEFT KNEE: Primary | ICD-10-CM

## 2021-02-10 DIAGNOSIS — M25.562 CHRONIC PAIN OF LEFT KNEE: Primary | ICD-10-CM

## 2021-02-10 PROCEDURE — 99211 OFF/OP EST MAY X REQ PHY/QHP: CPT | Performed by: FAMILY MEDICINE

## 2021-02-10 PROCEDURE — 99213 OFFICE O/P EST LOW 20 MIN: CPT | Performed by: FAMILY MEDICINE

## 2021-02-10 PROCEDURE — 20610 DRAIN/INJ JOINT/BURSA W/O US: CPT | Performed by: FAMILY MEDICINE

## 2021-02-10 PROCEDURE — G8419 CALC BMI OUT NRM PARAM NOF/U: HCPCS | Performed by: FAMILY MEDICINE

## 2021-02-10 PROCEDURE — 3017F COLORECTAL CA SCREEN DOC REV: CPT | Performed by: FAMILY MEDICINE

## 2021-02-10 PROCEDURE — G8427 DOCREV CUR MEDS BY ELIG CLIN: HCPCS | Performed by: FAMILY MEDICINE

## 2021-02-10 PROCEDURE — G8484 FLU IMMUNIZE NO ADMIN: HCPCS | Performed by: FAMILY MEDICINE

## 2021-02-10 PROCEDURE — 6360000002 HC RX W HCPCS

## 2021-02-10 PROCEDURE — 1036F TOBACCO NON-USER: CPT | Performed by: FAMILY MEDICINE

## 2021-02-10 RX ORDER — TRIAMCINOLONE ACETONIDE 40 MG/ML
40 INJECTION, SUSPENSION INTRA-ARTICULAR; INTRAMUSCULAR ONCE
Status: COMPLETED | OUTPATIENT
Start: 2021-02-10 | End: 2021-02-10

## 2021-02-10 RX ORDER — BUPIVACAINE HYDROCHLORIDE 5 MG/ML
4 INJECTION, SOLUTION EPIDURAL; INTRACAUDAL ONCE
Status: COMPLETED | OUTPATIENT
Start: 2021-02-10 | End: 2021-02-10

## 2021-02-10 RX ADMIN — TRIAMCINOLONE ACETONIDE 40 MG: 40 INJECTION, SUSPENSION INTRA-ARTICULAR; INTRAMUSCULAR at 16:32

## 2021-02-10 RX ADMIN — BUPIVACAINE HYDROCHLORIDE 20 MG: 5 INJECTION, SOLUTION EPIDURAL; INTRACAUDAL at 16:31

## 2021-02-10 ASSESSMENT — PAIN SCALES - GENERAL: PAINLEVEL_OUTOF10: 7

## 2021-02-10 NOTE — PROGRESS NOTES
Relationships    Social connections     Talks on phone: Not on file     Gets together: Not on file     Attends L[employer] day service: Not on file     Active member of club or organization: Not on file     Attends meetings of clubs or organizations: Not on file     Relationship status: Not on file    Intimate partner violence     Fear of current or ex partner: Not on file     Emotionally abused: Not on file     Physically abused: Not on file     Forced sexual activity: Not on file   Other Topics Concern    Not on file   Social History Narrative    Not on file       Current Outpatient Medications   Medication Sig Dispense Refill    naproxen (NAPROSYN) 500 MG tablet Take 1 tablet by mouth 2 times daily (with meals) 60 tablet 1    fluticasone (FLONASE) 50 MCG/ACT nasal spray 1 spray by Each Nare route daily 1 Spray in each nostril 2 Bottle 1     Current Facility-Administered Medications   Medication Dose Route Frequency Provider Last Rate Last Admin    bupivacaine (PF) (MARCAINE) 0.5 % injection 20 mg  4 mL Intra-articular Once Bethanie Prince MD        triamcinolone acetonide (KENALOG-40) injection 40 mg  40 mg Intra-articular Once Bethanie Prince MD           Allergies:  sheis allergic to norco [hydrocodone-acetaminophen]. ROS:  CV:  Denies chest pain; palpitations; shortness of breath; swelling of feet, ankles; and loss of consciousness. CON: Denies fever and dizziness. ENT:  Denies hearing loss / ringing, ear infections hoarseness, and swallowing problems. RESP:  Denies chronic cough, spitting up blood, and asthma/wheezing. GI: Denies abdominal pain, change in bowel habits, nausea or vomiting, and blood in stools. :  Denies frequent urination, burning or painful urination, blood in the urine, and bladder incontinence. NEURO:  Denies headache, memory loss, sleep disturbance, and tremor or movement disorder.     PHYSICAL EXAM: /89 (Site: Right Upper Arm, Position: Sitting, Cuff Size: Medium Adult)   Pulse 82   Ht 5' 2\" (1.575 m)   Wt 159 lb 9.6 oz (72.4 kg)   BMI 29.19 kg/m²   GENERAL: Vadim Mcfadden is a 45 y.o. female who is alert and oriented and sitting comfortably in our office. SKIN:  Intact without rashes, lesions or ulcerations. NEURO: Sensation to the extremity is intact. VASC:  Capillary refill is less than 3 seconds. Distal pulses are palpable. There is no lymphadenopathy. Knee Exam  Musculoskeletal/Neurologic:  Inspection-Swelling: mild, Ecchymosis: no  Palpation-Tenderness:along medial patella  Pain with patellar grind: no  ROM-WNL  Strength- WNL  Sensation-normal to light touch    Special Tests-  Varus Laxity: negative   Valgus Laxity:  negative   Anterior Drawer: negative   Posterior Drawer: negative  Lachman's: negative  Quentin's:negative  Thessaly: negative  Gait: antalgic  PSYCH:  Good fund of knowledge and displays understanding of exam.    RADIOLOGY:  1/27/2021        Impression   1. Small joint effusion. 2. Mild tricompartmental osteoarthrosis. 3. No acute fracture or dislocation.               IMPRESSION:     1. Chronic pain of left knee      PLAN:   We discussed some of the etiologies and natural histories of     ICD-10-CM    1. Chronic pain of left knee  M25.562 bupivacaine (PF) (MARCAINE) 0.5 % injection 20 mg    G89.29 triamcinolone acetonide (KENALOG-40) injection 40 mg   . We discussed the various treatment alternatives including anti-inflammatory medications, physical therapy, injections, further imaging studies and as a last resort surgery. KNEE INJECTION PROCEDURE NOTE:  The patient was identified. The left knee was confirmed with the patient. Consent was obtained and a time out was performed. After a sterile prep with Betadine followed by an alcohol wipe the knee was injected using a bent knee lateral joint line approach with a mixture of 4 mL of 0.5% Marcaine and 40 mg of Kenalog. Patient tolerated the procedure well without post injection complications. I instructed the patient to call our office immediately if they have any swelling or increased pain at the injection site. Return to clinic in Return in about 6 weeks (around 3/24/2021), or if symptoms worsen or fail to improve, for f/u left knee pain. Porsche Calvillo     Please be aware portions of this note were completed using voice recognition software and unforeseen errors may have occurred    Electronically signed by Rocio Evans MD on 2/10/2021 at 4:30 PM

## 2021-02-10 NOTE — PROGRESS NOTES
Visit Information    Have you changed or started any medications since your last visit including any over-the-counter medicines, vitamins, or herbal medicines? no   Have you stopped taking any of your medications? Is so, why? -  no  Are you having any side effects from any of your medications? - no    Have you seen any other physician or provider since your last visit?  no   Have you had any other diagnostic tests since your last visit? yes - XR    Have you been seen in the emergency room and/or had an admission in a hospital since we last saw you?  no   Have you had your routine dental cleaning in the past 6 months?  no     Do you have an active MyChart account? If no, what is the barrier?   No: pending    Patient Care Team:  Michi Navarro MD as PCP - General (Family Medicine)  Edgard Novak MD as Consulting Physician (Gastroenterology)    Medical History Review  Past Medical, Family, and Social History reviewed and does contribute to the patient presenting condition    Health Maintenance   Topic Date Due    Hepatitis C screen  1970    Shingles Vaccine (1 of 2) 03/15/2019    Flu vaccine (1) 09/01/2020    Cervical cancer screen  03/13/2024 (Originally 3/15/1990)    Diabetes screen  08/08/2021    Breast cancer screen  09/30/2021    Lipid screen  08/08/2023    DTaP/Tdap/Td vaccine (2 - Td) 09/10/2028    Colon cancer screen colonoscopy  02/11/2030    HIV screen  Completed    Hepatitis A vaccine  Aged Out    Hepatitis B vaccine  Aged Out    Hib vaccine  Aged Out    Meningococcal (ACWY) vaccine  Aged Out    Pneumococcal 0-64 years Vaccine  Aged Out

## 2023-07-12 ENCOUNTER — HOSPITAL ENCOUNTER (OUTPATIENT)
Age: 53
Setting detail: SPECIMEN
Discharge: HOME | End: 2023-07-12

## 2023-07-12 LAB
ALBUMIN SERPL-MCNC: 4.5 G/DL (ref 3.5–5.2)
ALBUMIN/GLOB SERPL: 1.6 {RATIO} (ref 1–2.5)
ALP SERPL-CCNC: 111 U/L (ref 35–104)
ALT SERPL-CCNC: 49 U/L (ref 5–33)
ANION GAP SERPL CALCULATED.3IONS-SCNC: 13 MMOL/L (ref 9–17)
AST SERPL-CCNC: 35 U/L
BASOPHILS # BLD: 0.03 K/UL (ref 0–0.2)
BASOPHILS NFR BLD: 1 % (ref 0–2)
BILIRUB SERPL-MCNC: 0.4 MG/DL (ref 0.3–1.2)
BUN SERPL-MCNC: 12 MG/DL (ref 6–20)
CALCIUM SERPL-MCNC: 9.3 MG/DL (ref 8.6–10.4)
CHLORIDE SERPL-SCNC: 106 MMOL/L (ref 98–107)
CHOLEST SERPL-MCNC: 255 MG/DL
CHOLESTEROL/HDL RATIO: 3.5
CO2 SERPL-SCNC: 22 MMOL/L (ref 20–31)
CREAT SERPL-MCNC: 0.9 MG/DL (ref 0.5–0.9)
CREAT UR-MCNC: 349.4 MG/DL (ref 28–217)
EOSINOPHIL # BLD: 0.03 K/UL (ref 0–0.44)
EOSINOPHILS RELATIVE PERCENT: 1 % (ref 1–4)
ERYTHROCYTE [DISTWIDTH] IN BLOOD BY AUTOMATED COUNT: 13.8 % (ref 11.8–14.4)
GFR SERPL CREATININE-BSD FRML MDRD: >60 ML/MIN/1.73M2
GLUCOSE SERPL-MCNC: 95 MG/DL (ref 70–99)
HCT VFR BLD AUTO: 46.2 % (ref 36.3–47.1)
HDLC SERPL-MCNC: 72 MG/DL
HGB BLD-MCNC: 14.6 G/DL (ref 11.9–15.1)
IMM GRANULOCYTES # BLD AUTO: <0.03 K/UL (ref 0–0.3)
IMM GRANULOCYTES NFR BLD: 0 %
LDLC SERPL CALC-MCNC: 159 MG/DL (ref 0–130)
LYMPHOCYTES # BLD: 55 % (ref 24–43)
LYMPHOCYTES NFR BLD: 2.78 K/UL (ref 1.1–3.7)
MAGNESIUM SERPL-MCNC: 2.4 MG/DL (ref 1.6–2.6)
MCH RBC QN AUTO: 27.5 PG (ref 25.2–33.5)
MCHC RBC AUTO-ENTMCNC: 31.6 G/DL (ref 28.4–34.8)
MCV RBC AUTO: 87 FL (ref 82.6–102.9)
MICROALBUMIN UR-MCNC: 30 MG/L
MICROALBUMIN/CREAT UR-RTO: 9 MCG/MG CREAT
MONOCYTES NFR BLD: 0.47 K/UL (ref 0.1–1.2)
MONOCYTES NFR BLD: 9 % (ref 3–12)
NEUTROPHILS NFR BLD: 34 % (ref 36–65)
NEUTS SEG NFR BLD: 1.74 K/UL (ref 1.5–8.1)
NRBC BLD-RTO: 0 PER 100 WBC
PLATELET # BLD AUTO: 263 K/UL (ref 138–453)
PMV BLD AUTO: 11 FL (ref 8.1–13.5)
POTASSIUM SERPL-SCNC: 3.9 MMOL/L (ref 3.7–5.3)
PROT SERPL-MCNC: 7.4 G/DL (ref 6.4–8.3)
RBC # BLD AUTO: 5.31 M/UL (ref 3.95–5.11)
SODIUM SERPL-SCNC: 141 MMOL/L (ref 135–144)
TRIGL SERPL-MCNC: 119 MG/DL
TSH SERPL DL<=0.05 MIU/L-ACNC: 0.53 UIU/ML (ref 0.3–5)
WBC OTHER # BLD: 5.1 K/UL (ref 3.5–11.3)

## 2023-07-13 LAB
EST. AVERAGE GLUCOSE BLD GHB EST-MCNC: 94 MG/DL
HBA1C MFR BLD: 4.9 % (ref 4–6)

## 2023-11-08 ENCOUNTER — HOSPITAL ENCOUNTER (OUTPATIENT)
Age: 53
Setting detail: SPECIMEN
Discharge: HOME | End: 2023-11-08

## 2023-11-08 LAB
ALBUMIN SERPL-MCNC: 4.2 G/DL (ref 3.5–5.2)
ALBUMIN/GLOB SERPL: 1.4 {RATIO} (ref 1–2.5)
ALP SERPL-CCNC: 114 U/L (ref 35–104)
ALT SERPL-CCNC: 37 U/L (ref 5–33)
ANION GAP SERPL CALCULATED.3IONS-SCNC: 12 MMOL/L (ref 9–17)
AST SERPL-CCNC: 29 U/L
BILIRUB SERPL-MCNC: 0.3 MG/DL (ref 0.3–1.2)
BUN SERPL-MCNC: 9 MG/DL (ref 6–20)
CALCIUM SERPL-MCNC: 9.3 MG/DL (ref 8.6–10.4)
CHLORIDE SERPL-SCNC: 104 MMOL/L (ref 98–107)
CO2 SERPL-SCNC: 22 MMOL/L (ref 20–31)
CREAT SERPL-MCNC: 0.8 MG/DL (ref 0.5–0.9)
GFR SERPL CREATININE-BSD FRML MDRD: >60 ML/MIN/1.73M2
GLUCOSE SERPL-MCNC: 89 MG/DL (ref 70–99)
POTASSIUM SERPL-SCNC: 4.2 MMOL/L (ref 3.7–5.3)
PROT SERPL-MCNC: 7.3 G/DL (ref 6.4–8.3)
SODIUM SERPL-SCNC: 138 MMOL/L (ref 135–144)

## (undated) DEVICE — FORCEPS BX L240CM WRK CHN 2.8MM STD CAP W/ NDL MIC MESH

## (undated) DEVICE — BASIN EMSIS 700ML GRAPHITE PLAS KID SHP GRAD

## (undated) DEVICE — GAUZE,SPONGE,4"X4",16PLY,STRL,LF,10/TRAY: Brand: MEDLINE

## (undated) DEVICE — CUP MED 1OZ CLR POLYPR FEED GRAD W/O LID

## (undated) DEVICE — BLOCK BITE 60FR RUBBER ADLT DENTAL